# Patient Record
Sex: FEMALE | Race: WHITE | NOT HISPANIC OR LATINO | URBAN - METROPOLITAN AREA
[De-identification: names, ages, dates, MRNs, and addresses within clinical notes are randomized per-mention and may not be internally consistent; named-entity substitution may affect disease eponyms.]

---

## 2019-05-08 ENCOUNTER — APPOINTMENT (OUTPATIENT)
Dept: URBAN - METROPOLITAN AREA CLINIC 186 | Age: 24
Setting detail: DERMATOLOGY
End: 2019-05-08

## 2019-05-08 DIAGNOSIS — Z41.9 ENCOUNTER FOR PROCEDURE FOR PURPOSES OTHER THAN REMEDYING HEALTH STATE, UNSPECIFIED: ICD-10-CM

## 2019-05-08 DIAGNOSIS — D22 MELANOCYTIC NEVI: ICD-10-CM

## 2019-05-08 DIAGNOSIS — E28.2 POLYCYSTIC OVARIAN SYNDROME: ICD-10-CM

## 2019-05-08 PROBLEM — D22.61 MELANOCYTIC NEVI OF RIGHT UPPER LIMB, INCLUDING SHOULDER: Status: ACTIVE | Noted: 2019-05-08

## 2019-05-08 PROCEDURE — OTHER REASSURANCE: OTHER

## 2019-05-08 PROCEDURE — OTHER ADDITIONAL NOTES: OTHER

## 2019-05-08 PROCEDURE — OTHER COUNSELING: OTHER

## 2019-05-08 PROCEDURE — OTHER COSMETIC CONSULTATION: LHR: OTHER

## 2019-05-08 ASSESSMENT — LOCATION DETAILED DESCRIPTION DERM
LOCATION DETAILED: RIGHT DISTAL DORSAL FOREARM
LOCATION DETAILED: UPPER STERNUM
LOCATION DETAILED: PERIUMBILICAL SKIN

## 2019-05-08 ASSESSMENT — LOCATION ZONE DERM
LOCATION ZONE: ARM
LOCATION ZONE: TRUNK

## 2019-05-08 ASSESSMENT — LOCATION SIMPLE DESCRIPTION DERM
LOCATION SIMPLE: ABDOMEN
LOCATION SIMPLE: CHEST
LOCATION SIMPLE: RIGHT FOREARM

## 2019-05-08 NOTE — PROCEDURE: ADDITIONAL NOTES
Detail Level: Simple
Additional Notes: Recommended evaluation by gynecologist or endocrinologist for her PCOS since this is the driving force behind the unwanted hair growth. Discussed that treating the area but not addressing the underlying issue would be fruitless. Suggested shaving the night before and applying an over the counter topical numbing.
Additional Notes: Patient states she was unable to tolerate oral contraceptive pills due to nausea.  We discussed likelihood that alternative treatments would be recommended to minimize the potential for persistent regrowth of the terminal hirsuit hairs.  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

## 2019-05-09 ENCOUNTER — APPOINTMENT (OUTPATIENT)
Dept: URBAN - METROPOLITAN AREA CLINIC 186 | Age: 24
Setting detail: DERMATOLOGY
End: 2019-05-09

## 2019-05-09 DIAGNOSIS — Z41.9 ENCOUNTER FOR PROCEDURE FOR PURPOSES OTHER THAN REMEDYING HEALTH STATE, UNSPECIFIED: ICD-10-CM

## 2019-05-09 PROCEDURE — OTHER LASER HAIR REMOVAL: OTHER

## 2019-05-09 PROCEDURE — OTHER ADDITIONAL NOTES: OTHER

## 2019-05-09 NOTE — PROCEDURE: LASER HAIR REMOVAL
Laser Type: Alexandrite 755nm
Total Pulses: 546
Detail Level: Zone
Post-Care Instructions: I reviewed with the patient in detail post-care instructions. Patient should avoid sun for a minimum of 4 weeks before and after treatment.
Number Of Prepaid Treatments (Will Not Render If 0): 0
Fluence (Will Not Render If 0): 20
Anesthesia Type: 1% lidocaine with epinephrine
Render Post-Care In The Note: No
Location Override: breast/abdomen
Tolerated Procedure (Optional): Tolerated Well
Post-Procedure Care: Immediate endpoint: perifollicular erythema and edema. Vaseline and ice applied. Post care reviewed with patient.
Shaving (Optional): The patient shaved at home
Pre-Procedure: Prior to proceeding the treatment areas were cleaned and all present put on their eye protection.
Spot Size: 15 mm
Pulse Duration: 20 ms
Consent: Written consent obtained, risks reviewed including but not limited to crusting, scabbing, blistering, scarring, darker or lighter pigmentary change, paradoxical hair regrowth, incomplete removal of hair and infection.

## 2019-05-09 NOTE — HPI: COSMETIC (LASER HAIR REMOVAL)
Eye Color: blue
Have You Had Laser Hair Removal Before?: has not had previous treatment
When Outside In The Sun, Do You...: very rarely burns, tans very easily

## 2019-06-07 ENCOUNTER — APPOINTMENT (OUTPATIENT)
Dept: URBAN - METROPOLITAN AREA CLINIC 186 | Age: 24
Setting detail: DERMATOLOGY
End: 2019-06-07

## 2019-06-07 DIAGNOSIS — Z41.9 ENCOUNTER FOR PROCEDURE FOR PURPOSES OTHER THAN REMEDYING HEALTH STATE, UNSPECIFIED: ICD-10-CM

## 2019-06-07 PROCEDURE — OTHER ADDITIONAL NOTES: OTHER

## 2019-06-07 PROCEDURE — OTHER LASER HAIR REMOVAL: OTHER

## 2019-06-07 ASSESSMENT — LOCATION DETAILED DESCRIPTION DERM
LOCATION DETAILED: PERIUMBILICAL SKIN
LOCATION DETAILED: LEFT LATERAL SUPERIOR CHEST

## 2019-06-07 ASSESSMENT — LOCATION SIMPLE DESCRIPTION DERM
LOCATION SIMPLE: ABDOMEN
LOCATION SIMPLE: CHEST

## 2019-06-07 ASSESSMENT — LOCATION ZONE DERM: LOCATION ZONE: TRUNK

## 2019-06-07 NOTE — PROCEDURE: LASER HAIR REMOVAL
External Cooling Fan Speed: 0
Total Pulses: 550
Consent: Written consent obtained, risks reviewed including but not limited to crusting, scabbing, blistering, scarring, darker or lighter pigmentary change, paradoxical hair regrowth, incomplete removal of hair and infection.
Pulse Duration: 20 ms
Detail Level: Zone
Shaving (Optional): The patient shaved at home
Fluence (Will Not Render If 0): 20
Post-Care Instructions: I reviewed with the patient in detail post-care instructions. Patient should avoid sun for a minimum of 4 weeks before and after treatment.
Spot Size: 15 mm
Treatment Number: 2
Post-Procedure Care: Immediate endpoint: perifollicular erythema and edema. Vaseline and ice applied. Post care reviewed with patient.
Anesthesia Type: 1% lidocaine with epinephrine
Number Of Prepaid Treatments (Will Not Render If 0): 8
Tolerated Procedure (Optional): Tolerated Well
Location Override: breast/abdomen
Render Post-Care In The Note: No
Pre-Procedure: Prior to proceeding the treatment areas were cleaned and all present put on their eye protection.
Laser Type: Alexandrite 755nm

## 2019-08-02 ENCOUNTER — APPOINTMENT (OUTPATIENT)
Dept: URBAN - METROPOLITAN AREA CLINIC 186 | Age: 24
Setting detail: DERMATOLOGY
End: 2019-09-03

## 2019-08-02 DIAGNOSIS — Z41.9 ENCOUNTER FOR PROCEDURE FOR PURPOSES OTHER THAN REMEDYING HEALTH STATE, UNSPECIFIED: ICD-10-CM

## 2019-08-02 PROCEDURE — OTHER LASER HAIR REMOVAL: OTHER

## 2019-08-02 PROCEDURE — OTHER ADDITIONAL NOTES: OTHER

## 2019-08-02 ASSESSMENT — LOCATION SIMPLE DESCRIPTION DERM
LOCATION SIMPLE: ABDOMEN
LOCATION SIMPLE: CHEST

## 2019-08-02 ASSESSMENT — LOCATION DETAILED DESCRIPTION DERM
LOCATION DETAILED: LEFT LATERAL SUPERIOR CHEST
LOCATION DETAILED: PERIUMBILICAL SKIN

## 2019-08-02 ASSESSMENT — LOCATION ZONE DERM: LOCATION ZONE: TRUNK

## 2019-08-02 NOTE — PROCEDURE: LASER HAIR REMOVAL
Spot Size: 15 mm
Anesthesia Type: 1% lidocaine with epinephrine
Fluence (Will Not Render If 0): 20
Location Override: breast/abdomen
Treatment Number: 0
Tolerated Procedure (Optional): Tolerated Well
Post-Care Instructions: I reviewed with the patient in detail post-care instructions. Patient should avoid sun for a minimum of 4 weeks before and after treatment.
Pre-Procedure: Prior to proceeding the treatment areas were cleaned and all present put on their eye protection.
Render Post-Care In The Note: No
Detail Level: Zone
Total Pulses: 980
Shaving (Optional): The patient shaved at home
Number Of Prepaid Treatments (Will Not Render If 0): 8
Consent: Written consent obtained, risks reviewed including but not limited to crusting, scabbing, blistering, scarring, darker or lighter pigmentary change, paradoxical hair regrowth, incomplete removal of hair and infection.
Pulse Duration: 20 ms
Treatment Number: 2
Post-Procedure Care: Immediate endpoint: perifollicular erythema and edema. Vaseline and ice applied. Post care reviewed with patient.
Laser Type: Alexandrite 755nm

## 2020-03-10 ENCOUNTER — APPOINTMENT (OUTPATIENT)
Dept: URBAN - METROPOLITAN AREA CLINIC 186 | Age: 25
Setting detail: DERMATOLOGY
End: 2020-03-10

## 2020-03-10 DIAGNOSIS — Z41.9 ENCOUNTER FOR PROCEDURE FOR PURPOSES OTHER THAN REMEDYING HEALTH STATE, UNSPECIFIED: ICD-10-CM

## 2020-03-10 PROCEDURE — OTHER LASER HAIR REMOVAL: OTHER

## 2020-03-10 PROCEDURE — OTHER ADDITIONAL NOTES: OTHER

## 2020-03-10 ASSESSMENT — LOCATION DETAILED DESCRIPTION DERM
LOCATION DETAILED: PERIUMBILICAL SKIN
LOCATION DETAILED: LEFT LATERAL SUPERIOR CHEST

## 2020-03-10 ASSESSMENT — LOCATION SIMPLE DESCRIPTION DERM
LOCATION SIMPLE: ABDOMEN
LOCATION SIMPLE: CHEST

## 2020-03-10 ASSESSMENT — LOCATION ZONE DERM: LOCATION ZONE: TRUNK

## 2020-06-23 DIAGNOSIS — Z11.59 SPECIAL SCREENING EXAMINATION FOR VIRAL DISEASE: Primary | ICD-10-CM

## 2020-06-26 DIAGNOSIS — Z11.59 SPECIAL SCREENING EXAMINATION FOR VIRAL DISEASE: ICD-10-CM

## 2020-06-26 NOTE — LETTER
June 29, 2020        [facility]  [address] [address]          COVID-19 Antibody, IgG   Date Value Ref Range Status   06/26/2020 Negative NEG^Negative Final     Comment:     Negative results do not rule out SARS-CoV-2 infection, particularly in those   who have been in contact with the virus.  Follow-up testing with a molecular   diagnostic should be considered to rule out infection in these individuals.  Results from antibody testing should not be used as the sole basis to diagnose   or exclude SARS-CoV-2 infection or to inform infection status.           You have tested NEGATIVE for COVID-19 antibodies. This suggests you have not had or been exposed to COVID-19. But it does not mean that for sure.     The test finds antibodies in most people 10 days after they get sick. For some people, it takes longer than 10 days for antibodies to show up. Others may never show antibodies against COVID-19, especially if they have weak immune systems.    If you have COVID-19 symptoms now, please stay home and away from others.     What is antibody testing?    This is a kind of blood test. We take a small sample of your blood, and then test it for something called  antibodies.      Your body makes antibodies to fight infection. If your blood has antibodies for a certain germ, it means you ve been infected with that germ in the past.     Sometimes, antibodies stay in your body for years after you ve had the infection. They can be there even if the germ didn t make you sick. They are a sign that your body fought off the infection.    Will this test find antibodies in everyone who s had COVID-19?    No. The test finds antibodies in most people 10 days after they get sick. For some people, it takes longer than 10 days for antibodies to show up. Others may never show antibodies against COVID-19, especially if they have weak immune systems.    What does it mean if the test finds COVID-19 antibodies?    If we find  these antibodies, it suggests:     This person has had the virus.     Their body s immune system fought the virus.     We don t know if this will help protect someone from getting COVID-19 again. Scientists are still learning about this.    What are the signs of COVID-19?    Signs of COVID-19 can appear from 2 to 14 days (up to 2 weeks) after you re infected. Some people have no symptoms or only mild symptoms. Others get very sick. The most common symptoms are:          Cough    Shortness of breath or trouble breathing  Or at least 2 of these symptoms:    Fever    Chills    Repeated shaking with chills    Muscle pain    Headache    Sore throat    Losing your sense of taste or smell    You may have other symptoms. Please contact your doctor or clinic for any symptoms that worry you.    Where can I get more information?     To learn the [facility] guidelines for staying home, please visit the [place] Department of Health website at [email or web address].[facility].us/diseases/coronavirus/basics.html    To learn more about COVID-19 and how to care for yourself at home, please visit the CDC website at https://www.cdc.gov/coronavirus/2019-ncov/about/steps-when-sick.html    For more options for care at [facility], please visit our website at [email or web address]    [facility] ([facility]) COVID-19 Hotline:  [phone]

## 2020-06-26 NOTE — LETTER
June 27, 2020        [facility]  [address] [address]    This letter provides a written record that you were tested for COVID-19 on  6/26/20..       Your result was negative. This means that we didn t find the virus that causes COVID-19 in your sample. A test may show negative when you do actually have the virus. This can happen when the virus is in the early stages of infection, before you feel illness symptoms.    If you have symptoms   Stay home and away from others (self-isolate) until you meet ALL of the guidelines below:    You ve had no fever--and no medicine that reduces fever--for 3 full days (72 hours). And      Your other symptoms have gotten better. For example, your cough or breathing has improved. And     At least 10 days have passed since your symptoms started.    During this time:    Stay home. Don t go to work, school or anywhere else.     Stay in your own room, including for meals. Use your own bathroom if you can.    Stay away from others in your home. No hugging, kissing or shaking hands. No visitors.    Clean  high touch  surfaces often (doorknobs, counters, handles, etc.). Use a household cleaning spray or wipes. You can find a full list on the EPA website at www.epa.gov/pesticide-registration/list-n-disinfectants-use-against-sars-cov-2.    Cover your mouth and nose with a mask, tissue or washcloth to avoid spreading germs.    Wash your hands and face often with soap and water.    Going back to work  Check with your employer for any guidelines to follow for going back to work.    Employers: This document serves as formal notice that your employee tested negative for COVID-19, as of the testing date shown above.

## 2020-06-27 LAB
SARS-COV-2 RNA SPEC QL NAA+PROBE: NOT DETECTED
SPECIMEN SOURCE: NORMAL

## 2020-06-28 DIAGNOSIS — Z11.59 SPECIAL SCREENING EXAMINATION FOR VIRAL DISEASE: ICD-10-CM

## 2020-06-28 NOTE — LETTER
June 30, 2020        [facility]  [address] [address]    This letter provides a written record that you were tested for COVID-19 on 6/28/20.       Your result was negative. This means that we didn t find the virus that causes COVID-19 in your sample. A test may show negative when you do actually have the virus. This can happen when the virus is in the early stages of infection, before you feel illness symptoms.    If you have symptoms   Stay home and away from others (self-isolate) until you meet ALL of the guidelines below:    You ve had no fever--and no medicine that reduces fever--for 3 full days (72 hours). And      Your other symptoms have gotten better. For example, your cough or breathing has improved. And     At least 10 days have passed since your symptoms started.    During this time:    Stay home. Don t go to work, school or anywhere else.     Stay in your own room, including for meals. Use your own bathroom if you can.    Stay away from others in your home. No hugging, kissing or shaking hands. No visitors.    Clean  high touch  surfaces often (doorknobs, counters, handles, etc.). Use a household cleaning spray or wipes. You can find a full list on the EPA website at www.epa.gov/pesticide-registration/list-n-disinfectants-use-against-sars-cov-2.    Cover your mouth and nose with a mask, tissue or washcloth to avoid spreading germs.    Wash your hands and face often with soap and water.    Going back to work  Check with your employer for any guidelines to follow for going back to work.    Employers: This document serves as formal notice that your employee tested negative for COVID-19, as of the testing date shown above.

## 2020-06-29 LAB
COVID-19 ANTIBODY IGG: NEGATIVE
LAB TEST METHOD: NORMAL
SARS-COV-2 RNA SPEC QL NAA+PROBE: NOT DETECTED
SPECIMEN SOURCE: NORMAL

## 2020-06-30 ENCOUNTER — OFFICE VISIT (OUTPATIENT)
Dept: FAMILY MEDICINE | Facility: CLINIC | Age: 25
End: 2020-06-30
Payer: COMMERCIAL

## 2020-06-30 VITALS
HEART RATE: 64 BPM | TEMPERATURE: 97.6 F | HEIGHT: 70 IN | BODY MASS INDEX: 29.41 KG/M2 | DIASTOLIC BLOOD PRESSURE: 79 MMHG | WEIGHT: 205.4 LBS | SYSTOLIC BLOOD PRESSURE: 122 MMHG

## 2020-06-30 DIAGNOSIS — R53.83 OTHER FATIGUE: Primary | ICD-10-CM

## 2020-06-30 SDOH — HEALTH STABILITY: MENTAL HEALTH: HOW OFTEN DO YOU HAVE A DRINK CONTAINING ALCOHOL?: 2-4 TIMES A MONTH

## 2020-06-30 SDOH — HEALTH STABILITY: MENTAL HEALTH: HOW OFTEN DO YOU HAVE SIX OR MORE DRINKS ON ONE OCCASION?: LESS THAN MONTHLY

## 2020-06-30 SDOH — HEALTH STABILITY: MENTAL HEALTH: HOW MANY DRINKS CONTAINING ALCOHOL DO YOU HAVE ON A TYPICAL DAY WHEN YOU ARE DRINKING?: 3 OR 4

## 2020-06-30 ASSESSMENT — ANXIETY QUESTIONNAIRES
3. WORRYING TOO MUCH ABOUT DIFFERENT THINGS: SEVERAL DAYS
5. BEING SO RESTLESS THAT IT IS HARD TO SIT STILL: SEVERAL DAYS
GAD7 TOTAL SCORE: 7
6. BECOMING EASILY ANNOYED OR IRRITABLE: SEVERAL DAYS
1. FEELING NERVOUS, ANXIOUS, OR ON EDGE: SEVERAL DAYS
7. FEELING AFRAID AS IF SOMETHING AWFUL MIGHT HAPPEN: SEVERAL DAYS
IF YOU CHECKED OFF ANY PROBLEMS ON THIS QUESTIONNAIRE, HOW DIFFICULT HAVE THESE PROBLEMS MADE IT FOR YOU TO DO YOUR WORK, TAKE CARE OF THINGS AT HOME, OR GET ALONG WITH OTHER PEOPLE: SOMEWHAT DIFFICULT
2. NOT BEING ABLE TO STOP OR CONTROL WORRYING: SEVERAL DAYS

## 2020-06-30 ASSESSMENT — PATIENT HEALTH QUESTIONNAIRE - PHQ9
5. POOR APPETITE OR OVEREATING: SEVERAL DAYS
SUM OF ALL RESPONSES TO PHQ QUESTIONS 1-9: 3

## 2020-06-30 ASSESSMENT — MIFFLIN-ST. JEOR: SCORE: 1771.94

## 2020-06-30 NOTE — PROGRESS NOTES
"Rodrigo Barboza  Vitals: /79   Pulse 64   Temp 97.6  F (36.4  C)   Ht 1.778 m (5' 10\")   Wt 93.2 kg (205 lb 6.4 oz)   BMI 29.47 kg/m    BMI= Body mass index is 29.47 kg/m .  Sport(s): Basketball    Vision: Right Eye: 20/100 Left Eye: 20/40 Both Eyes: 20/40  Correction: glasses and contacts but she isn't wearing them now.    Pupils: equal    Sickle Cell Trait: Discussed and Patient refused Sickle Cell Trait testing and signed waiver  Concussions: Concussion fact sheet reviewed. 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    General/Medical  Eyes/Vision: Normal; NPC:  1/2 cm  Ears/Hearing: Normal  Nose: Normal  Mouth/Dental: Normal  Throat: Normal  Thyroid: Normal  Lymph Nodes: Normal  Lungs: Normal  Abdomen: Normal  Skin: Normal    Musculoskeletal/Orthopaedic  Neck/Cervical: Normal  Thoracic/Lumbar: Normal  Shoulder/Upper Arm: Normal  Elbow/Forearm: Normal  Wrist/Hand/Fingers: Normal  Hip/Thigh: Normal  Knee/Patella: Normal  Lower Leg/Ankles: Normal  Foot/Toes: Normal    Cardiovascular Screening    Heart Murmur:No Grade: NA  Symmetric Femoral pulses: Yes    Stigmata of Marfan's Syndrome - if appropriate:  Not applicable      TRIAD Risk Factors  Low EA withor without DE/ED Some dietary restrictions, current/part history of DE   Low BMI BMI > 18.5 or > 90% EW** or weight stable   Delayed Menarche Menarche before 15 years   Oligomenorrhea and/or Amenorrhea 6 - 9 menses in 12 months   Low BMD     Stress Reaction/Fracture  Specific Bone(s)  Other Bone None         TRIAD Score   Risk Score Status     Cumulative Risk 2 - Moderate Risk   Assessment Full Clearance   Medical Plan         COMMENTS, RECOMMENDATIONS and PARTICIPATION STATUS  Cleared    1) PCOS:  Not on OCPs, 3 types tried but lots of side effects.  Using condoms for contraception.   2) Weight gain starting with 30 lbs Spring 2019 and now has gained 15-20 more lbs.  Lost 15 lbs over break by doing 2 cardio " workouts per day and only eating 2 meals per day.    -Saw Endocrinologist in [place] and recommended labs and thyroid US and dexamethasone suppression test  -Will need a referral to Endocrinologist at     3) Premenstrual Migraines occasionally:  None recently, no meds    4)  H/o of depression in the past and more recently MANPREET with panic attacks.  No meds.  Doing well.   -Will likely need Psychological intervention for mental health and     Walt Reynolds MD, Sports Medicine Fellow was present and examined the patient as well.      Reina Fields MD, CAQ, FACSM, CCD  [facility]  Sports Medicine and Bone Health  Team Physician;  Athletics        -

## 2020-06-30 NOTE — LETTER
"  6/30/2020      RE: [facility]  [address] [address]       [facility]  Vitals: /79   Pulse 64   Temp 97.6  F (36.4  C)   Ht 1.778 m (5' 10\")   Wt 93.2 kg (205 lb 6.4 oz)   BMI 29.47 kg/m    BMI= Body mass index is 29.47 kg/m .  Sport(s): Basketball    Vision: Right Eye: 20/100 Left Eye: 20/40 Both Eyes: 20/40  Correction: glasses and contacts but she isn't wearing them now.    Pupils: equal    Sickle Cell Trait: Discussed and Patient refused Sickle Cell Trait testing and signed waiver  Concussions: Concussion fact sheet reviewed. 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    General/Medical  Eyes/Vision: Normal; NPC:  1/2 cm  Ears/Hearing: Normal  Nose: Normal  Mouth/Dental: Normal  Throat: Normal  Thyroid: Normal  Lymph Nodes: Normal  Lungs: Normal  Abdomen: Normal  Skin: Normal    Musculoskeletal/Orthopaedic  Neck/Cervical: Normal  Thoracic/Lumbar: Normal  Shoulder/Upper Arm: Normal  Elbow/Forearm: Normal  Wrist/Hand/Fingers: Normal  Hip/Thigh: Normal  Knee/Patella: Normal  Lower Leg/Ankles: Normal  Foot/Toes: Normal    Cardiovascular Screening    Heart Murmur:No Grade: NA  Symmetric Femoral pulses: Yes    Stigmata of Marfan's Syndrome - if appropriate:  Not applicable      TRIAD Risk Factors  Low EA withor without DE/ED Some dietary restrictions, current/part history of DE   Low BMI BMI > 18.5 or > 90% EW** or weight stable   Delayed Menarche Menarche before 15 years   Oligomenorrhea and/or Amenorrhea 6 - 9 menses in 12 months   Low BMD     Stress Reaction/Fracture  Specific Bone(s)  Other Bone None         TRIAD Score   Risk Score Status     Cumulative Risk 2 - Moderate Risk   Assessment Full Clearance   Medical Plan         COMMENTS, RECOMMENDATIONS and PARTICIPATION STATUS  Cleared    1) PCOS:  Not on OCPs, 3 types tried but lots of side effects.  Using condoms for contraception.   2) Weight gain starting with 30 lbs Spring 2019 " and now has gained 15-20 more lbs.  Lost 15 lbs over break by doing 2 cardio workouts per day and only eating 2 meals per day.    -Saw Endocrinologist in [place] and recommended labs and thyroid US and dexamethasone suppression test  -Will need a referral to Endocrinologist at     3) Premenstrual Migraines occasionally:  None recently, no meds    4)  H/o of depression in the past and more recently MANPREET with panic attacks.  No meds.  Doing well.   -Will likely need Psychological intervention for mental health and     Walt Reynolds MD, Sports Medicine Fellow was present and examined the patient as well.      Reina Fields MD, CAQ, FACSM, CCD  [facility]  Sports Medicine and Bone Health  Team Physician;  Athletics        -      Reina Fields MD

## 2020-06-30 NOTE — LETTER
Date:July 1, 2020      Patient was self referred, no letter generated. Do not send.        [facility] Health Information

## 2020-07-01 ASSESSMENT — ANXIETY QUESTIONNAIRES: GAD7 TOTAL SCORE: 7

## 2020-07-02 ENCOUNTER — ANCILLARY PROCEDURE (OUTPATIENT)
Dept: ULTRASOUND IMAGING | Facility: CLINIC | Age: 25
End: 2020-07-02
Attending: FAMILY MEDICINE
Payer: COMMERCIAL

## 2020-07-02 DIAGNOSIS — R53.83 OTHER FATIGUE: ICD-10-CM

## 2020-07-02 LAB
CORTIS SERPL-MCNC: 0.6 UG/DL (ref 4–22)
T4 FREE SERPL-MCNC: 0.88 NG/DL (ref 0.76–1.46)
TSH SERPL DL<=0.005 MIU/L-ACNC: 1.11 MU/L (ref 0.4–4)

## 2020-07-03 LAB
IGF-I BLD-MCNC: 206 NG/ML (ref 105–337)
THYROPEROXIDASE AB SERPL-ACNC: <10 IU/ML

## 2020-07-06 LAB — 17OHP SERPL-MCNC: 139 NG/DL

## 2020-07-21 ENCOUNTER — VIRTUAL VISIT (OUTPATIENT)
Dept: FAMILY MEDICINE | Facility: CLINIC | Age: 25
End: 2020-07-21
Payer: COMMERCIAL

## 2020-07-21 DIAGNOSIS — F32.A DEPRESSION, UNSPECIFIED DEPRESSION TYPE: ICD-10-CM

## 2020-07-21 DIAGNOSIS — F41.9 ANXIETY: ICD-10-CM

## 2020-07-21 DIAGNOSIS — R63.5 WEIGHT GAIN: Primary | ICD-10-CM

## 2020-07-21 NOTE — PROGRESS NOTES
[facility]  Sports Medicine Clinic  Virtual Visit           SUBJECTIVE:     Rodrigo Barboza is a 24 year old female presenting to clinic today for follow up.    Review of records:   - PCOS: Patient's not on any oral contraceptives.  Has tried 3 different kinds in the past however was unable to tolerate side effects.  Currently using condoms for contraception.  - Undergoing work-up for weight gain.  She had a 30 pound weight gain back in spring 2019 and now has gained 15-20 more pounds.  Was able to lose approximately 15 pounds recently by doing extensive cardio workouts.  2 a day and only eating 2 meals.  -Was seen by endocrinology in [place] who recommended labs and thyroid ultrasound.  They also recommended dexamethasone suppression test.  -Referral was placed to endocrinologist at the [facility].  -History of depression in the past and generalized anxiety disorder with panic attacks.  Not currently on any medications and overall doing well.    Interim history:   Rodrigo overall feels like she is doing well.  Patient did go to get her laboratory work done.  Dexamethasone suppression test showed a cortisol level of 0 point 6 in the morning.  Reference range of 4- 22.  Progesterone level within normal range.  Insulin like growth factor within normal range.  As recommended per endocrinology she had a thyroid ultrasound done which showed a very small nodule on the right thyroid without any concerning features.  Thyroid gland itself is not enlarged.  Thyroid antibodies negative. Normal TSH and free T4.  Overall patient is doing well.  Has not followed up with sports psychology yet at this time.    PMH, Medications and Allergies were reviewed and updated as needed.    ROS:  As noted above otherwise negative.    There is no problem list on file for this patient.      No current outpatient medications on file.              OBJECTIVE:     Vitals: NA todays visit was a virtual  Visit.      Exam:  Constitutional: healthy, alert and no distress  Psychiatric: mentation appears normal and affect normal/bright          ASSESSMENT & PLAN:      1. Weight gain  Discussed with patient the importance of following up with endocrinology regarding recent laboratory work and thyroid studies.  Patient expressed understanding and plans to make a follow-up appointment with them.  We will have a release of records sent so that we can see any further laboratory work that is done.    2. Depression, unspecified depression type  3. Anxiety  Overall patient is feeling well at this time.  Does need to touch base with sports psychology.  Katie Orellana, ATC agreed to help Rodrigo set this up.      Follow-up in 3-4 weeks.  Return sooner if develops new or worsening symptoms.    Appointment start time:  10:58 AM  Appointment end time: 11:16 AM    This is a telephone visit that took 18 minutes.    Options for treatment and/or follow-up care were reviewed with the patient and , Max Trammell. Patient was actively involved in the decision making process. Patient verbalized understanding and was in agreement with the plan.    The patient was seen by and discussed with Dr.Suzanne MIKE Fields MD, CAQ, CCD    Sofi Torres DO  Primary Care Sports Medicine Fellow

## 2020-07-21 NOTE — LETTER
7/21/2020      RE: Rodrigo Barboza  [address] [facility] [address] [address]       [facility]  Sports Medicine Clinic  Virtual Visit           SUBJECTIVE:     Rodrigo Barboza is a 24 year old female presenting to clinic today for follow up.    Review of records:   - PCOS: Patient's not on any oral contraceptives.  Has tried 3 different kinds in the past however was unable to tolerate side effects.  Currently using condoms for contraception.  - Undergoing work-up for weight gain.  She had a 30 pound weight gain back in spring 2019 and now has gained 15-20 more pounds.  Was able to lose approximately 15 pounds recently by doing extensive cardio workouts.  2 a day and only eating 2 meals.  -Was seen by endocrinology in [place] who recommended labs and thyroid ultrasound.  They also recommended dexamethasone suppression test.  -Referral was placed to endocrinologist at the [facility].  -History of depression in the past and generalized anxiety disorder with panic attacks.  Not currently on any medications and overall doing well.    Interim history:   Rodrigo overall feels like she is doing well.  Patient did go to get her laboratory work done.  Dexamethasone suppression test showed a cortisol level of 0 point 6 in the morning.  Reference range of 4- 22.  Progesterone level within normal range.  Insulin like growth factor within normal range.  As recommended per endocrinology she had a thyroid ultrasound done which showed a very small nodule on the right thyroid without any concerning features.  Thyroid gland itself is not enlarged.  Thyroid antibodies negative. Normal TSH and free T4.  Overall patient is doing well.  Has not followed up with sports psychology yet at this time.    PMH, Medications and Allergies were reviewed and updated as needed.    ROS:  As noted above otherwise negative.    There is no problem list on file for this patient.      No current outpatient medications on file.               OBJECTIVE:     Vitals: NA todays visit was a virtual Visit.      Exam:  Constitutional: healthy, alert and no distress  Psychiatric: mentation appears normal and affect normal/bright          ASSESSMENT & PLAN:      1. Weight gain  Discussed with patient the importance of following up with endocrinology regarding recent laboratory work and thyroid studies.  Patient expressed understanding and plans to make a follow-up appointment with them.  We will have a release of records sent so that we can see any further laboratory work that is done.    2. Depression, unspecified depression type  3. Anxiety  Overall patient is feeling well at this time.  Does need to touch base with sports psychology.  Katie Orellana, ATC agreed to help Rodrigo set this up.      Follow-up in 3-4 weeks.  Return sooner if develops new or worsening symptoms.    Appointment start time:  10:58 AM  Appointment end time: 11:16 AM    This is a telephone visit that took 18 minutes.    Options for treatment and/or follow-up care were reviewed with the patient and , Max Tramemll. Patient was actively involved in the decision making process. Patient verbalized understanding and was in agreement with the plan.    The patient was seen by and discussed with Dr.Suzanne MIKE Fields MD, CAQ, CCD    Sofi Torres DO  Primary Care Sports Medicine Fellow      Attending Note:   I have talked with this patient along with Dr. Torres via video visit and have reviewed the clinical presentation, imaging and labs with the fellow. I agree with the treatment plan as outlined. The plan was formulated with the fellow on the day of the patient's visit.   Reina Fields MD, CAQ, CCD  [facility]  Sports Medicine and Bone Health    Results for RODRIGO MOREL (MRN [id number]) as of 7/22/2020 21:29   Ref. Range 7/2/2020 07:27   17-OH Progesterone Latest Units: ng/dL 139   Cortisol Serum Latest Ref Range: 4 - 22 ug/dL 0.6 (L)   T4 Free Latest  Ref Range: 0.76 - 1.46 ng/dL 0.88   TSH Latest Ref Range: 0.40 - 4.00 mU/L 1.11   Ins Growth Factor 1 Latest Ref Range: 105 - 337 ng/ml 206   Thyroid Peroxidase Antibody Latest Ref Range: <35 IU/mL <10           Reina Fields MD

## 2020-07-21 NOTE — LETTER
Date:July 23, 2020      Patient was self referred, no letter generated. Do not send.        [facility] Health Information

## 2020-07-23 NOTE — PROGRESS NOTES
Attending Note:   I have talked with this patient along with Dr. Torres via video visit and have reviewed the clinical presentation, imaging and labs with the fellow. I agree with the treatment plan as outlined. The plan was formulated with the fellow on the day of the patient's visit.   Reina Fields MD, CAQ, CCD  [facility]  Sports Medicine and Bone Health    Results for HOUSTON MOREL (MRN [id number]) as of 7/22/2020 21:29   Ref. Range 7/2/2020 07:27   17-OH Progesterone Latest Units: ng/dL 139   Cortisol Serum Latest Ref Range: 4 - 22 ug/dL 0.6 (L)   T4 Free Latest Ref Range: 0.76 - 1.46 ng/dL 0.88   TSH Latest Ref Range: 0.40 - 4.00 mU/L 1.11   Ins Growth Factor 1 Latest Ref Range: 105 - 337 ng/ml 206   Thyroid Peroxidase Antibody Latest Ref Range: <35 IU/mL <10

## 2020-07-27 ENCOUNTER — TRANSFERRED RECORDS (OUTPATIENT)
Dept: HEALTH INFORMATION MANAGEMENT | Facility: CLINIC | Age: 25
End: 2020-07-27

## 2020-07-27 ENCOUNTER — DOCUMENTATION ONLY (OUTPATIENT)
Dept: FAMILY MEDICINE | Facility: CLINIC | Age: 25
End: 2020-07-27
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
## 2020-08-05 NOTE — PROGRESS NOTES
[facility] ATHLETIC MEDICINE  [facility]   Sport Psychology Intake Note      Location of Visit: [facility] Athletic Department  Date of Visit: 7/27/20  Duration of Session: 60 minutes  Referred by:     Rodrigo Barboza is a 24 year old Choose not to answer female.  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 She is not an international student.     Self-reported Concerns/Symptoms:  Anxiety/worry  Body image  Transition/adjustment    Presenting Concern:  I reports that she has experienced recent weight gain of about 30lbs in past 6 months and feels anxious about it.     Suicide and Risk Assessment:  Recent suicidal thoughts: No  Past suicidal thoughts: No  Recent homicidal thoughts: No  Any attempts in the past: No  Any family/friends/loved ones die by suicide: No  Plan or considering various methods: No  Access to guns: No  Protective factors: no h/o suicide attempt, no plan or intent, no h/o risky impulsive behavior, no access to lethal means, h/o seeking help when needed, future oriented, feeling hopeful, none to minimal alcohol use , commitment to family, good social support   and stable housing  Verbal contract for safety: Yes    Rodrigo denies current urges to self-harm, homicidal ideation, suicidal ideation, means, plans, or intent.    Mental Status & Observations:  Rodrigo appeared generally alert and oriented. Dress was appropriate to the weather and occasion. Grooming and hygiene were appropriate. Eye contact was good. Speech was of normal volume and normal. Thought processes were relevant, logical and goal-directed. Thought content was within normal limits with no evidence of psychotic or paranoid features. Memory appeared intact. She exhibited normal motor activity during the appointment. Mood was appropriate with congruent affect. Insight and judgment appeared age appropriate with good focus in session.  Behavior was  "candid, congenial, cooperative and open    Family Background:  ***    Education:  Ct is in her first year of her graduate program at [employer] in sports management. She notes that she excels academically, especially when she is interested in the subject matter. She reports that she earned a 4.0 GPA in HS. 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 She attended the [employer] her freshman and sophomore years of college. She red-shirted her first year, and states that she did not experience what she anticipated at [employer] with respect to playing time, feeling valued on the team and her relationship with her . Ct participated in sport psychology services at [employer] with Dr. Esteban Wilson for concerns regarding anxiety, depression and concentration difficulties. She recalls at times it being difficult to get out of bed during her sophomore year. Ct states that she decided to transfer to [employer] for her phuong and senior years. She describes another disappointing experience where she was \"punished\" for not playing well, had troubles communicating with her  and felt \"disliked by my .\" She recalls experiencing panic attacks prior to practices    History of medical and mental health concerns:  Concussion: {NO/YES:128235}  Current/past sports injury: {NO/YES:466794}  Nutrition/eating/appetite: {NO/YES:314002}  Body image: {NO/YES:157824}  Substance use (alcohol, caffeine, tobacco, cannabis, other): {NO/YES:007114}  Family history of substance abuse: {NO/YES:891442}  Medications/vitamins/supplements: ***  Concentration/focus/ADHD: {NO/YES:562159}  Caffeine use: {NO/YES:150719}  PTSD/trauma/abuse: {NO/YES:312671}  Significant loss: {NO/YES:452410}  Known history of mental health in self: {bisphx2:857965}  History of therapy or " prescribed medications: {NO/YES:216785}  Known history of mental health in family member(s): {NO/YES:459935}  Legal issues: {NO/YES:182950}  Financial concerns: {NO/YES:122930}   Receives {bispscholar:600855} scholarship  Stephanie/Hobbies/Personality:    Identifies as {bispreligion:996016}   Reported hobbies consist of ***    Coping skills, strengths and supports:   {bispcope:154053}    Goals for counseling:  ***    Clinical impressions or Other:  ***     Therapy objectives/goals:  {bisptxgoals:433914}    Therapy follow-up plan:  {bispfup:057444}      Devonte Guerrier, PhD LP, [facility]

## 2020-08-11 ENCOUNTER — OFFICE VISIT (OUTPATIENT)
Dept: ORTHOPEDICS | Facility: CLINIC | Age: 25
End: 2020-08-11
Payer: COMMERCIAL

## 2020-08-11 DIAGNOSIS — M25.462 PAIN AND SWELLING OF LEFT KNEE: Primary | ICD-10-CM

## 2020-08-11 DIAGNOSIS — M25.562 PAIN AND SWELLING OF LEFT KNEE: Primary | ICD-10-CM

## 2020-08-11 DIAGNOSIS — M25.562 ACUTE PAIN OF LEFT KNEE: Primary | ICD-10-CM

## 2020-08-11 NOTE — LETTER
Date:August 14, 2020      Patient was self referred, no letter generated. Do not send.        [facility]icians Health Information

## 2020-08-11 NOTE — LETTER
8/11/2020      RE: Rodrigo Barboza  [address] [address]       Initial Visit     Referring MD: No ref. provider found     CC: Left knee swelling and pain     HPI: Rodrigo Barboza is a 24 year old female varsity  who presents with left knee swelling and pain. She has a history of a partial medial meniscectomy in the knee when she was in 8th grade. Her understanding is that it was a bucket handle tear, but that the meniscus had to be removed. She has had occasional symptoms with this knee over time, but more recently she has had more swelling and associated pain. The symptoms are activity related, and they are starting to impact her ability to participate in all volleyball activities.     PMH: There is no problem list on file for this patient.       PSH:   Past Surgical History:   Procedure Laterality Date     KNEE SURGERY          Medications:   No current outpatient medications on file.     No current facility-administered medications for this visit.         Allergies:   Allergies   Allergen Reactions     Sulfa Drugs Hives        SH:   Social History     Occupational History     Not on file   Tobacco Use     Smoking status: Never Smoker   Substance and Sexual Activity     Alcohol use: Yes     Frequency: 2-4 times a month     Drinks per session: 3 or 4     Binge frequency: Less than monthly     Drug use: Never     Sexual activity: Not Currently     Partners: Male        ROS: General ROS: negative  Respiratory ROS: no cough, shortness of breath, or wheezing  Cardiovascular ROS: no chest pain or dyspnea on exertion     PE:   Gen: A&OX3    Knee       RIGHT   LEFT   Skin:    Intact   Intact   ROM:     -2-135  -2-130   Effusion:    Neg   Mild-mod  Medial joint line tenderness: Neg   Neg   Lateral joint line tenderness: Neg   Neg   Anisha:    Neg   Neg   Patella crepitus:   Neg   Neg   Patella tenderness:   Neg   Neg   Lachman:    Neg   Neg   Pivot shift:    Neg   Neg   Valgus stress:    Neg   Neg   Varus stress:    Neg   Neg   Posterior drawer:   Neg   Neg   N-V     intact   intact   Hip:     nml   nml   Lower extremity edema:  Neg   Neg     The knee feels tight in hyperflexion.      Impression:   24 year old female with left knee swelling and occasional associated pain     Plan:   The risks and benefits of the available surgical and non-surgical treatment options were discussed with the patient/athlete.  The concern is that this is related to downstream effects from her partial meniscectomy.  We will obtain baseline radiographs, including long leg standing films, as well as an MRI to evaluate the integrity of the cartilage and meniscus.      cc:   Referring provider   [unfilled]     Primary care provider   No primary provider on file.       Tae Haynes MD

## 2020-08-12 ENCOUNTER — ANCILLARY PROCEDURE (OUTPATIENT)
Dept: GENERAL RADIOLOGY | Facility: CLINIC | Age: 25
End: 2020-08-12
Attending: ORTHOPAEDIC SURGERY
Payer: COMMERCIAL

## 2020-08-12 ENCOUNTER — ANCILLARY PROCEDURE (OUTPATIENT)
Dept: MRI IMAGING | Facility: CLINIC | Age: 25
End: 2020-08-12
Attending: ORTHOPAEDIC SURGERY
Payer: COMMERCIAL

## 2020-08-12 DIAGNOSIS — M25.562 ACUTE PAIN OF LEFT KNEE: ICD-10-CM

## 2020-08-12 DIAGNOSIS — Z11.59 SPECIAL SCREENING EXAMINATION FOR VIRAL DISEASE: Primary | ICD-10-CM

## 2020-08-12 NOTE — PROGRESS NOTES
Initial Visit     Referring MD: No ref. provider found     CC: Left knee swelling and pain     HPI: Rodrigo Barboza is a 24 year old female varsity  who presents with left knee swelling and pain. She has a history of a partial medial meniscectomy in the knee when she was in 8th grade. Her understanding is that it was a bucket handle tear, but that the meniscus had to be removed. She has had occasional symptoms with this knee over time, but more recently she has had more swelling and associated pain. The symptoms are activity related, and they are starting to impact her ability to participate in all volleyball activities.     PMH: There is no problem list on file for this patient.       PSH:   Past Surgical History:   Procedure Laterality Date     KNEE SURGERY          Medications:   No current outpatient medications on file.     No current facility-administered medications for this visit.         Allergies:   Allergies   Allergen Reactions     Sulfa Drugs Hives        SH:   Social History     Occupational History     Not on file   Tobacco Use     Smoking status: Never Smoker   Substance and Sexual Activity     Alcohol use: Yes     Frequency: 2-4 times a month     Drinks per session: 3 or 4     Binge frequency: Less than monthly     Drug use: Never     Sexual activity: Not Currently     Partners: Male        ROS: General ROS: negative  Respiratory ROS: no cough, shortness of breath, or wheezing  Cardiovascular ROS: no chest pain or dyspnea on exertion     PE:   Gen: A&OX3    Knee       RIGHT   LEFT   Skin:    Intact   Intact   ROM:     -2-135  -2-130   Effusion:    Neg   Mild-mod  Medial joint line tenderness: Neg   Neg   Lateral joint line tenderness: Neg   Neg   Anisha:    Neg   Neg   Patella crepitus:   Neg   Neg   Patella tenderness:   Neg   Neg   Lachman:    Neg   Neg   Pivot shift:    Neg   Neg   Valgus stress:   Neg   Neg   Varus stress:    Neg   Neg   Posterior drawer:   Neg   Neg   N-V      intact   intact   Hip:     nml   nml   Lower extremity edema:  Neg   Neg     The knee feels tight in hyperflexion.      Impression:   24 year old female with left knee swelling and occasional associated pain     Plan:   The risks and benefits of the available surgical and non-surgical treatment options were discussed with the patient/athlete.  The concern is that this is related to downstream effects from her partial meniscectomy.  We will obtain baseline radiographs, including long leg standing films, as well as an MRI to evaluate the integrity of the cartilage and meniscus.      cc:   Referring provider   [unfilled]     Primary care provider   No primary provider on file.

## 2020-08-13 DIAGNOSIS — Z11.59 SPECIAL SCREENING EXAMINATION FOR VIRAL DISEASE: ICD-10-CM

## 2020-08-14 ENCOUNTER — DOCUMENTATION ONLY (OUTPATIENT)
Dept: FAMILY MEDICINE | Facility: CLINIC | Age: 25
End: 2020-08-14

## 2020-08-14 NOTE — PROGRESS NOTES
[facility] ATHLETICS  Concetta ATC initial assessment note  Date of service performed: 08/14/2020    Concern: Chronic injury  Body part: Knee  Description: Left  Injury: Chrondral defect, medical meniscus  Type: Athletics related  Date of injury: chronic    Pt. Had a phone conference with myself and Dr. Tae Haynes this afternoon to review the imaging that was taken on 08/12/2020.  Dr. Haynes discussed the MRI findings and explain conservative vs surgical options.  At this time, pt is preferring to proceed with non-surgical interventions including wearing an  brace for VB activity, reducing training volume, restricting jumping activity in VB and in the weight room.  She is going to speak with her mother tonight and will return with any questions.    Plan: continue to monitor swelling and pain.  Use modalities and training volume modification to reduce pain and monitor for new/changing symptoms.    Max Jaffe, ATC

## 2020-08-15 LAB
SARS-COV-2 RNA SPEC QL NAA+PROBE: NOT DETECTED
SPECIMEN SOURCE: NORMAL

## 2020-08-18 DIAGNOSIS — Z11.59 SPECIAL SCREENING EXAMINATION FOR VIRAL DISEASE: Primary | ICD-10-CM

## 2020-08-20 DIAGNOSIS — Z11.59 SPECIAL SCREENING EXAMINATION FOR VIRAL DISEASE: ICD-10-CM

## 2020-08-21 LAB
SARS-COV-2 RNA SPEC QL NAA+PROBE: NOT DETECTED
SPECIMEN SOURCE: NORMAL

## 2020-08-27 DIAGNOSIS — Z11.59 SPECIAL SCREENING EXAMINATION FOR VIRAL DISEASE: ICD-10-CM

## 2020-08-28 LAB
SARS-COV-2 RNA SPEC QL NAA+PROBE: NOT DETECTED
SPECIMEN SOURCE: NORMAL

## 2020-09-09 DIAGNOSIS — Z11.59 SPECIAL SCREENING EXAMINATION FOR VIRAL DISEASE: Primary | ICD-10-CM

## 2020-09-12 DIAGNOSIS — Z11.59 SPECIAL SCREENING EXAMINATION FOR VIRAL DISEASE: ICD-10-CM

## 2020-09-12 LAB
SARS-COV-2 RNA SPEC QL NAA+PROBE: NOT DETECTED
SPECIMEN SOURCE: NORMAL

## 2020-09-18 DIAGNOSIS — Z11.59 SPECIAL SCREENING EXAMINATION FOR VIRAL DISEASE: ICD-10-CM

## 2020-09-19 LAB
SARS-COV-2 RNA SPEC QL NAA+PROBE: NOT DETECTED
SPECIMEN SOURCE: NORMAL

## 2020-09-28 DIAGNOSIS — Z11.59 SPECIAL SCREENING EXAMINATION FOR VIRAL DISEASE: Primary | ICD-10-CM

## 2020-09-29 DIAGNOSIS — Z11.59 SPECIAL SCREENING EXAMINATION FOR VIRAL DISEASE: ICD-10-CM

## 2020-09-30 LAB
SARS-COV-2 RNA SPEC QL NAA+PROBE: NOT DETECTED
SPECIMEN SOURCE: NORMAL

## 2020-10-09 DIAGNOSIS — Z11.59 SPECIAL SCREENING EXAMINATION FOR VIRAL DISEASE: ICD-10-CM

## 2020-10-09 PROCEDURE — U0003 INFECTIOUS AGENT DETECTION BY NUCLEIC ACID (DNA OR RNA); SEVERE ACUTE RESPIRATORY SYNDROME CORONAVIRUS 2 (SARS-COV-2) (CORONAVIRUS DISEASE [COVID-19]), AMPLIFIED PROBE TECHNIQUE, MAKING USE OF HIGH THROUGHPUT TECHNOLOGIES AS DESCRIBED BY CMS-2020-01-R: HCPCS | Mod: 90 | Performed by: PATHOLOGY

## 2020-10-09 PROCEDURE — 99000 SPECIMEN HANDLING OFFICE-LAB: CPT | Performed by: PATHOLOGY

## 2020-10-10 LAB
LABORATORY COMMENT REPORT: NORMAL
SARS-COV-2 RNA SPEC QL NAA+PROBE: NORMAL
SARS-COV-2 RNA SPEC QL NAA+PROBE: NORMAL
SARS-COV-2 RNA SPEC QL NAA+PROBE: NOT DETECTED
SPECIMEN SOURCE: NORMAL

## 2020-10-12 ENCOUNTER — DOCUMENTATION ONLY (OUTPATIENT)
Dept: ORTHOPEDICS | Facility: CLINIC | Age: 25
End: 2020-10-12

## 2020-10-12 NOTE — PROGRESS NOTES
"[facility] ATHLETIC MEDICINE  [facility]   Nutrition Note    Date of service: 8/5/2020    Assessment:  Gained 30lbs last spring (2019) over 3 month span. Diagnosed with PCOS in 2018. Mom has Hashimoto disease so was wondering if she too has it. Feels better when she is \"off\" gluten and dairy. Thyroid test done  2 nodules on thyroid, needs 1 more thyroid test?? Doesn't like meat very much but will eat it if it is cooked for her.    Today's weight: 200lbs  Today's height: n/a  BMI: n/a  ETOH: n/a  Body fat %: n/a    REE (kcal): n/a  TDEE (kcal): n/a    No results found for: HEBMP      Diagnosis  Overweight/obesity     Related to: potential overconsumption of calories or underlying medical condition     As evidenced by: current weight.      Intervention:  Provide a 3 day food log to gain information on current diet and potential areas to help in. Schedule follow up meeting with RD to continue to assess nutritional status.    Outcome goals:  Healthy weight loss approaching goal body weight/composition    Activity goals:  3 day food log    Monitoring/evaluation:  Follow up again in a week. (Athlete never did respond to requests for a follow up meeting).      Thiago Holden RD      "

## 2020-10-14 ENCOUNTER — DOCUMENTATION ONLY (OUTPATIENT)
Dept: FAMILY MEDICINE | Facility: CLINIC | Age: 25
End: 2020-10-14

## 2020-10-14 NOTE — PROGRESS NOTES
"  [facility] ATHLETIC MEDICINE  [facility]   Brief Check-in during quarantine/isolation    Due to COVID-19 pandemic, this check-in was conducted via telehealth services.     Date: 10/14/20  Duration of Call: 15 minutes  Student-athlete Name: Rodrigo Atherton     Called Rodrigo Barboza via phone to briefly check-in regarding how she is doing in isolation/quarantine. Listened and validated her experience. It's her 2nd time in quarantine. She notes \"looking at the upside\" and finding opportunity in it such as resting her knee, spending time with her dog and enjoying the Fall. She notes doing well.  Discussed possible challenges, what to expect and coping skills to use during this time. Encouraged maintaining a daily routine, staying connected with social support,  engaging in self-care activities, and reaching out for help if needed. Also shared mental health and wellness resources, and how to schedule a sport psychology session if she would like to follow-up in the near future. A follow-up email was sent to the student-athlete with a list of mental health/wellness resources and an anonymous survey for them to complete, if interested, inquiring about their experience in isolation/quarantine.         Devonte Guerrier, PhD LP, CMPC         "

## 2020-11-02 ENCOUNTER — DOCUMENTATION ONLY (OUTPATIENT)
Dept: FAMILY MEDICINE | Facility: CLINIC | Age: 25
End: 2020-11-02
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
## 2020-11-11 ENCOUNTER — VIRTUAL VISIT (OUTPATIENT)
Dept: ORTHOPEDICS | Facility: CLINIC | Age: 25
End: 2020-11-11
Payer: COMMERCIAL

## 2020-11-11 DIAGNOSIS — F41.1 GENERALIZED ANXIETY DISORDER: Primary | ICD-10-CM

## 2020-11-11 PROCEDURE — 99212 OFFICE O/P EST SF 10 MIN: CPT | Mod: 95 | Performed by: FAMILY MEDICINE

## 2020-11-11 RX ORDER — HYDROXYZINE PAMOATE 25 MG/1
25-50 CAPSULE ORAL 3 TIMES DAILY PRN
Qty: 90 CAPSULE | Refills: 0 | Status: SHIPPED | OUTPATIENT
Start: 2020-11-11

## 2020-11-11 NOTE — PROGRESS NOTES
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 Has had anxiety through high school and college. Feels like her anxiey is getting worse recently. Used to have panic episodes last year. Hasn't had any this year.  No thoughts of self harm or suicide. Has not tried any medications in the past.      PMH, Medications and Allergies were reviewed and updated as needed.    ROS:  As noted above otherwise negative.    There is no problem list on file for this patient.      Current Outpatient Medications   Medication Sig Dispense Refill     hydrOXYzine (VISTARIL) 25 MG capsule Take 1-2 capsules (25-50 mg) by mouth 3 times daily as needed for itching 90 capsule 0     sertraline (ZOLOFT) 50 MG tablet Take 1 tablet (50 mg) by mouth daily 30 tablet 0            OBJECTIVE:       Vitals: There were no vitals filed for this visit.  BMI: There is no height or weight on file to calculate BMI.    GENERAL: Healthy, alert and no distress  EYES: Eyes grossly normal to inspection.  No discharge or erythema, or obvious scleral/conjunctival abnormalities.  RESP: No audible wheeze, cough, or visible cyanosis.  No visible retractions or increased work of breathing.    SKIN: Visible skin clear. No significant rash, abnormal pigmentation or lesions.  NEURO: Cranial nerves grossly intact.  Mentation and speech appropriate for age.  PSYCH: Mentation appears normal, affect normal/bright, judgement and insight intact, normal speech and appearance well-groomed.          ASSESSMENT and PLAN:     1. Generalized anxiety disorder  - sertraline (ZOLOFT) 50 MG tablet; Take 1 tablet (50 mg) by mouth daily  Dispense: 30 tablet; Refill: 0  - hydrOXYzine (VISTARIL) 25 MG capsule; Take 1-2 capsules (25-50 mg) by mouth 3 times daily as needed for itching  Dispense: 90 capsule;  Refill: 0    Return to clinic in 2 weeks for follow up of symptoms. Return sooner if develops new or worsening symptoms.    Options for treatment and/or follow-up care were reviewed with the patient was actively involved in the decision making process. Patient verbalized understanding and was in agreement with the plan.    The patient was seen by and discussed with Dr.Suzanne MIKE Fields MD, CAQ, CCD    Sofi Torres,   Primary Care Sports Medicine Fellow      Video-Visit Details    Type of service:  Video Visit    Video Start Time: 3:40 pm     Video End Time (time video stopped): 3:50 pm    Total Visit time: 10 min    Originating Location (pt. Location): Home

## 2020-11-11 NOTE — LETTER
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 Has had anxiety through high school and college. Feels like her anxiey is getting worse recently. Used to have panic episodes last year. Hasn't had any this year.  No thoughts of self harm or suicide. Has not tried any medications in the past.      PMH, Medications and Allergies were reviewed and updated as needed.    ROS:  As noted above otherwise negative.    There is no problem list on file for this patient.      Current Outpatient Medications   Medication Sig Dispense Refill     hydrOXYzine (VISTARIL) 25 MG capsule Take 1-2 capsules (25-50 mg) by mouth 3 times daily as needed for itching 90 capsule 0     sertraline (ZOLOFT) 50 MG tablet Take 1 tablet (50 mg) by mouth daily 30 tablet 0            OBJECTIVE:       Vitals: There were no vitals filed for this visit.  BMI: There is no height or weight on file to calculate BMI.    GENERAL: Healthy, alert and no distress  EYES: Eyes grossly normal to inspection.  No discharge or erythema, or obvious scleral/conjunctival abnormalities.  RESP: No audible wheeze, cough, or visible cyanosis.  No visible retractions or increased work of breathing.    SKIN: Visible skin clear. No significant rash, abnormal pigmentation or lesions.  NEURO: Cranial nerves grossly intact.  Mentation and speech appropriate for age.  PSYCH: Mentation appears normal, affect normal/bright, judgement and insight intact, normal speech and appearance well-groomed.          ASSESSMENT and PLAN:     1. Generalized anxiety disorder  - sertraline (ZOLOFT) 50 MG tablet; Take 1 tablet (50 mg) by mouth daily  Dispense: 30 tablet; Refill: 0  - hydrOXYzine (VISTARIL) 25 MG capsule; Take 1-2 capsules  "(25-50 mg) by mouth 3 times daily as needed for itching  Dispense: 90 capsule; Refill: 0    Return to clinic in 2 weeks for follow up of symptoms. Return sooner if develops new or worsening symptoms.    Options for treatment and/or follow-up care were reviewed with the patient was actively involved in the decision making process. Patient verbalized understanding and was in agreement with the plan.    The patient was seen by and discussed with Dr.Suzanne MIKE Fields MD, CAQ, CCD    Sofi Torres,   Primary Care Sports Medicine Fellow      Video-Visit Details    Type of service:  Video Visit    Video Start Time: 3:40 pm     Video End Time (time video stopped): 3:50 pm    Total Visit time: 10 min    Originating Location (pt. Location): Home           Rodrigo Barboza is a 25 year old female who is being evaluated via a billable video visit.      The patient has been notified of following:     \"This video visit will be conducted via a call between you and your physician/provider. We have found that certain health care needs can be provided without the need for an in-person physical exam.  This service lets us provide the care you need with a video conversation.  If a prescription is necessary we can send it directly to your pharmacy.  If lab work is needed we can place an order for that and you can then stop by our lab to have the test done at a later time.    Video visits are billed at different rates depending on your insurance coverage.  Please reach out to your insurance provider with any questions.    If during the course of the call the physician/provider feels a video visit is not appropriate, you will not be charged for this service.\"    Patient has given verbal consent for Video visit? Yes  How would you like to obtain your AVS? MyChart  Will anyone else be joining your video visit? Yes, ATC            Attending Note:   I have talked with this patient along with Dr. Torres via video visit and have reviewed the " clinical presentation and watched the video examination with the fellow. I agree with the treatment plan as outlined. The plan was formulated with the fellow on the day of the patient's visit.   Reina Fields MD, CAQ, CCD  [facility]  Sports Medicine and Bone Health        Reina Fields MD

## 2020-11-11 NOTE — PROGRESS NOTES
"Rodrigo Barboza is a 25 year old female who is being evaluated via a billable video visit.      The patient has been notified of following:     \"This video visit will be conducted via a call between you and your physician/provider. We have found that certain health care needs can be provided without the need for an in-person physical exam.  This service lets us provide the care you need with a video conversation.  If a prescription is necessary we can send it directly to your pharmacy.  If lab work is needed we can place an order for that and you can then stop by our lab to have the test done at a later time.    Video visits are billed at different rates depending on your insurance coverage.  Please reach out to your insurance provider with any questions.    If during the course of the call the physician/provider feels a video visit is not appropriate, you will not be charged for this service.\"    Patient has given verbal consent for Video visit? Yes  How would you like to obtain your AVS? MyChart  Will anyone else be joining your video visit? Yes, ATC          "

## 2020-11-12 ENCOUNTER — DOCUMENTATION ONLY (OUTPATIENT)
Dept: FAMILY MEDICINE | Facility: CLINIC | Age: 25
End: 2020-11-12
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
## 2020-11-14 NOTE — PROGRESS NOTES
Attending Note:   I have talked with this patient along with Dr. Torres via video visit and have reviewed the clinical presentation and watched the video examination with the fellow. I agree with the treatment plan as outlined. The plan was formulated with the fellow on the day of the patient's visit.   Reina Fields MD, CAQ, CCD  [facility]  Sports Medicine and Bone Health

## 2020-11-23 ENCOUNTER — DOCUMENTATION ONLY (OUTPATIENT)
Dept: FAMILY MEDICINE | Facility: CLINIC | Age: 25
End: 2020-11-23
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
## 2020-12-01 ENCOUNTER — OFFICE VISIT (OUTPATIENT)
Dept: FAMILY MEDICINE | Facility: CLINIC | Age: 25
End: 2020-12-01
Payer: COMMERCIAL

## 2020-12-01 VITALS
BODY MASS INDEX: 32.64 KG/M2 | HEIGHT: 70 IN | SYSTOLIC BLOOD PRESSURE: 123 MMHG | DIASTOLIC BLOOD PRESSURE: 72 MMHG | HEART RATE: 99 BPM | WEIGHT: 228 LBS

## 2020-12-01 DIAGNOSIS — F41.9 ANXIETY: ICD-10-CM

## 2020-12-01 DIAGNOSIS — F32.A DEPRESSION, UNSPECIFIED DEPRESSION TYPE: Primary | ICD-10-CM

## 2020-12-01 RX ORDER — ESCITALOPRAM OXALATE 20 MG/1
10 TABLET ORAL DAILY
Qty: 15 TABLET | Refills: 0 | Status: SHIPPED | OUTPATIENT
Start: 2020-12-01 | End: 2020-12-01

## 2020-12-01 RX ORDER — ESCITALOPRAM OXALATE 20 MG/1
10 TABLET ORAL DAILY
Qty: 30 TABLET | Refills: 0 | Status: SHIPPED | OUTPATIENT
Start: 2020-12-01

## 2020-12-01 ASSESSMENT — MIFFLIN-ST. JEOR: SCORE: 1869.45

## 2020-12-01 NOTE — LETTER
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 Has had anxiety through high school and college. Feels like her anxiey is getting worse recently. Used to have panic episodes last year. Hasn't had any this year.  No thoughts of self harm or suicide. Has not tried any medications in the past.    Update: After starting sertraline developed nausea, vomiting, diarrhea and rash. Reduced down to 25 mg and symptoms improved. Tried increasing back to 50 mg this past week and symptoms returned. Is taking vistaril intermittently without difficulty.    PMH, Medications and Allergies were reviewed and updated as needed.    ROS:  As noted above otherwise negative.    There is no problem list on file for this patient.      Current Outpatient Medications   Medication Sig Dispense Refill     escitalopram (LEXAPRO) 20 MG tablet Take 0.5 tablets (10 mg) by mouth daily 15 tablet 0     hydrOXYzine (VISTARIL) 25 MG capsule Take 1-2 capsules (25-50 mg) by mouth 3 times daily as needed for itching 90 capsule 0              OBJECTIVE:     Vitals:   Vitals:    12/01/20 1018   BP: 123/72   Pulse: 99   Weight: 103.4 kg (228 lb)   Height: 1.778 m (5' 10\")     BMI: Body mass index is 32.71 kg/m .    Gen:  Well nourished and in no acute distress  HEENT: Extraocular movement intact.  Neck: supple  Skin: No rash, erythema, ecchymosis or obvious abnormality  Psych: Euthymic   Neuro: Alert and oriented.            ASSESSMENT & PLAN:      1. Depression, unspecified depression type  2. Anxiety  Was emotionally feeling improvement on sertraline however she experienced GI symptoms and rash as noted above.  - Due to above reaction will discontinue sertraline and start lexapro  - Start at 10 mg  - May " continue PRN Vistaril   - If similar reaction on excitalopram will consider medication outside selective serotonin reuptake inhibitor family. Ie wellbutrin  - escitalopram (LEXAPRO) 20 MG tablet; Take 0.5 tablets (10 mg) by mouth daily  Dispense: 30 tablet; Refill: 0    Check in with Max ATc over the next 2 weeks to discuss how she is tolerating medication. Return to clinic at the beginning of January following break. Return sooner if develops new or worsening symptoms.    Options for treatment and/or follow-up care were reviewed with the patient and . Patient was actively involved in the decision making process. Patient verbalized understanding and was in agreement with the plan.    The patient was seen by and discussed with Dr.Suzanne MIKE Fields MD, CAQ, CCD    Sofi Torres DO  Primary Care Sports Medicine Fellow      Attending Note:   I have personally examined this patient and have reviewed the clinical presentation and progress note with the fellow. I agree with the treatment plan as outlined. The plan was formulated with the fellow on the day of the patient's visit. I have reviewed all imaging with the fellow and agree with the findings in the documentation.  Also noted a rash flat and red on her cheeks.    > 25 min of total time spent in one-on-one evalution and discussion with patient regarding nature of problem, course, prior treatments, and therapeutic options,> 50% of which was spent in counseling and coordination of care:    Reina Fields MD, CAQ, CCD  [facility]  Sports Medicine and Bone Health      Reina Fields MD

## 2020-12-01 NOTE — PROGRESS NOTES
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 Has had anxiety through high school and college. Feels like her anxiey is getting worse recently. Used to have panic episodes last year. Hasn't had any this year.  No thoughts of self harm or suicide. Has not tried any medications in the past.    Update: After starting sertraline developed nausea, vomiting, diarrhea and rash. Reduced down to 25 mg and symptoms improved. Tried increasing back to 50 mg this past week and symptoms returned. Is taking vistaril intermittently without difficulty.    PMH, Medications and Allergies were reviewed and updated as needed.    ROS:  As noted above otherwise negative.    There is no problem list on file for this patient.      Current Outpatient Medications   Medication Sig Dispense Refill     escitalopram (LEXAPRO) 20 MG tablet Take 0.5 tablets (10 mg) by mouth daily 15 tablet 0     hydrOXYzine (VISTARIL) 25 MG capsule Take 1-2 capsules (25-50 mg) by mouth 3 times daily as needed for itching 90 capsule 0              OBJECTIVE:     Vitals:   Vitals:    12/01/20 1018   BP: 123/72   Pulse: 99   Weight: 103.4 kg (228 lb)   Height: 1.778 m (5' 10\")     BMI: Body mass index is 32.71 kg/m .    Gen:  Well nourished and in no acute distress  HEENT: Extraocular movement intact.  Neck: supple  Skin: No rash, erythema, ecchymosis or obvious abnormality  Psych: Euthymic   Neuro: Alert and oriented.            ASSESSMENT & PLAN:      1. Depression, unspecified depression type  2. Anxiety  Was emotionally feeling improvement on sertraline however she experienced GI symptoms and rash as noted above.  - Due to above reaction will discontinue sertraline and start lexapro  - Start at 10 mg  - May continue PRN Vistaril   - If similar reaction on excitalopram will consider " medication outside selective serotonin reuptake inhibitor family. Ie wellbutrin  - escitalopram (LEXAPRO) 20 MG tablet; Take 0.5 tablets (10 mg) by mouth daily  Dispense: 30 tablet; Refill: 0    Check in with Max Moore over the next 2 weeks to discuss how she is tolerating medication. Return to clinic at the beginning of January following break. Return sooner if develops new or worsening symptoms.    Options for treatment and/or follow-up care were reviewed with the patient and . Patient was actively involved in the decision making process. Patient verbalized understanding and was in agreement with the plan.    The patient was seen by and discussed with Dr.Suzanne MIKE Fields MD, CAQ, CCD    Sofi Torres DO  Primary Care Sports Medicine Fellow

## 2020-12-01 NOTE — PROGRESS NOTES
Attending Note:   I have personally examined this patient and have reviewed the clinical presentation and progress note with the fellow. I agree with the treatment plan as outlined. The plan was formulated with the fellow on the day of the patient's visit. I have reviewed all imaging with the fellow and agree with the findings in the documentation.  Also noted a rash flat and red on her cheeks.    > 25 min of total time spent in one-on-one evalution and discussion with patient regarding nature of problem, course, prior treatments, and therapeutic options,> 50% of which was spent in counseling and coordination of care:    Reina Fields MD, CAQ, CCD  [facility]  Sports Medicine and Bone Health

## 2021-01-03 ENCOUNTER — DOCUMENTATION ONLY (OUTPATIENT)
Dept: FAMILY MEDICINE | Facility: CLINIC | Age: 26
End: 2021-01-03

## 2021-01-03 NOTE — PROGRESS NOTES
[facility] ATC follow-up note  Date of service performed: 01/12/2021    Concern/injury: GI distress/acid reflux    Assessment/plan: Pt has been taking Prilosec since 1/5/21 and has reported no further problems with diarrhea and/or vomiting.  Plan is to continue with medication and monitor diet for food sensitivity.   Follow-up with physician if problem persisits. Pt understands this plan and has no questions at this time.    RBMARGARITA Jaffe ATC      [facility] ATC initial assessment note  Date of service performed: 01/03/2021    Concern: Acute illness  Body part: GI distress  Description: GI distress  Injury: N/A  Type: Non-athletics related  Date of injury: 01/03/2021    S: Pt reported to the ATR this morning following a recovery session in the gym that included stretching and foam rolling c/o feeling nauseous.  She then went to the bathroom and reported that she had vomited.  Pt denies eating any new foods or feeling sick prior to the recovery session.  Pt denies any other symptoms.     O: Pt is alert and orientated to PPT.  She shows no outward signs of physical or mental distress.      A: Gastro-intestinal distress    P: Sent home for the rest of the day.  She has an appointment to see Dr. Fields tomorrow, 1/4/2021, in the [facility] for evaluation.      Max Jaffe ATC

## 2021-01-04 ENCOUNTER — HEALTH MAINTENANCE LETTER (OUTPATIENT)
Age: 26
End: 2021-01-04

## 2021-01-04 ENCOUNTER — OFFICE VISIT (OUTPATIENT)
Dept: FAMILY MEDICINE | Facility: CLINIC | Age: 26
End: 2021-01-04
Payer: COMMERCIAL

## 2021-01-04 VITALS
WEIGHT: 225.9 LBS | HEART RATE: 83 BPM | TEMPERATURE: 98.4 F | BODY MASS INDEX: 33.46 KG/M2 | DIASTOLIC BLOOD PRESSURE: 82 MMHG | SYSTOLIC BLOOD PRESSURE: 130 MMHG | HEIGHT: 69 IN

## 2021-01-04 DIAGNOSIS — R10.13 EPIGASTRIC PAIN: Primary | ICD-10-CM

## 2021-01-04 DIAGNOSIS — R19.7 DIARRHEA, UNSPECIFIED TYPE: ICD-10-CM

## 2021-01-04 ASSESSMENT — MIFFLIN-ST. JEOR: SCORE: 1844.06

## 2021-01-04 NOTE — LETTER
"  1/4/2021      RE: Rodrigo Barboza  [address] [address]       S: 24 yo  female  here for recurrent episodes of vomiting and diarrhea.  See previous notes.      Episodes of vomiting and diarrhea that last about 3 days.  This episode seems less intense then before.  Nausea too.  No pain.  Stomach feels uncomfortable.  Last episode was yesterday.  Hasn't noticed any type of pattern of foods that bother her.  She typically stays away from gluten and dairy.  Had extensive work-up and evaluation in  for these problems without identification of an etiology.  Never tried proton pump inhibitors.  Denies NSAIDs, etoh, caffeine.  Wt stable. No blood in stool or emesis.  Not dark colored either.  Denies eating any unusual foods.  No one else is sick. No fever.      MANPREET:  Lexapro is going well.  Feels overall less anxiety.      Fam Hx:  Mom with Hashimoto's    Current Outpatient Medications   Medication     Omeprazole ([employer] OMEPRAZOLE) 20 MG TBDD     escitalopram (LEXAPRO) 20 MG tablet     hydrOXYzine (VISTARIL) 25 MG capsule     No current facility-administered medications for this visit.      O: NAD  /82   Pulse 83   Temp 98.4  F (36.9  C)   Ht 1.753 m (5' 9\")   Wt 102.5 kg (225 lb 14.4 oz)   LMP 12/04/2020 (Exact Date)   BMI 33.36 kg/m    HEENT: neg  Neck: no lad  Lungs; cta  Heart; rrr  Abd:  Soft and nttp except mild epigastric ttp.  No g/r.  No HSM      A:  Recurrent vomiting and diarrhea in a   with MANPREET    P:  Check labs  Trial of omeprazole along with Zofran.   RTC in 3-4 weeks.      Sofi Bhatia, Sports Medicine Fellow saw and examined the patient as well.      Max Graves, ATC for  [employer] was present.      Reina Fields MD, CAQ, FACSM, CCD  [facility]  Sports Medicine and Bone Health  Team Physician;  Athletics          Reina Fiedls MD    "

## 2021-01-04 NOTE — PROGRESS NOTES
"S: 26 yo  female  here for recurrent episodes of vomiting and diarrhea.  See previous notes.      Episodes of vomiting and diarrhea that last about 3 days.  This episode seems less intense then before.  Nausea too.  No pain.  Stomach feels uncomfortable.  Last episode was yesterday.  Hasn't noticed any type of pattern of foods that bother her.  She typically stays away from gluten and dairy.  Had extensive work-up and evaluation in  for these problems without identification of an etiology.  Never tried proton pump inhibitors.  Denies NSAIDs, etoh, caffeine.  Wt stable. No blood in stool or emesis.  Not dark colored either.  Denies eating any unusual foods.  No one else is sick. No fever.      MANPREET:  Lexapro is going well.  Feels overall less anxiety.      Fam Hx:  Mom with Hashimoto's    Current Outpatient Medications   Medication     Omeprazole ([employer] OMEPRAZOLE) 20 MG TBDD     escitalopram (LEXAPRO) 20 MG tablet     hydrOXYzine (VISTARIL) 25 MG capsule     No current facility-administered medications for this visit.      O: NAD  /82   Pulse 83   Temp 98.4  F (36.9  C)   Ht 1.753 m (5' 9\")   Wt 102.5 kg (225 lb 14.4 oz)   LMP 12/04/2020 (Exact Date)   BMI 33.36 kg/m    HEENT: neg  Neck: no lad  Lungs; cta  Heart; rrr  Abd:  Soft and nttp except mild epigastric ttp.  No g/r.  No HSM      A:  Recurrent vomiting and diarrhea in a   with MANPREET    P:  Check labs  Trial of omeprazole along with Zofran.   RTC in 3-4 weeks.      Sofi Bhatia, Sports Medicine Fellow saw and examined the patient as well.      Max Graves, KRISTIN for  [employer] was present.      Reina Fields MD, CAQ, FACSM, CCD  [facility]  Sports Medicine and Bone Health  Team Physician;  Athletics      "

## 2021-01-11 ENCOUNTER — DOCUMENTATION ONLY (OUTPATIENT)
Dept: FAMILY MEDICINE | Facility: CLINIC | Age: 26
End: 2021-01-11

## 2021-01-12 ENCOUNTER — OFFICE VISIT (OUTPATIENT)
Dept: FAMILY MEDICINE | Facility: CLINIC | Age: 26
End: 2021-01-12
Payer: COMMERCIAL

## 2021-01-12 VITALS
DIASTOLIC BLOOD PRESSURE: 82 MMHG | HEIGHT: 70 IN | BODY MASS INDEX: 32.34 KG/M2 | WEIGHT: 225.9 LBS | SYSTOLIC BLOOD PRESSURE: 129 MMHG | HEART RATE: 105 BPM

## 2021-01-12 DIAGNOSIS — R19.7 DIARRHEA, UNSPECIFIED TYPE: ICD-10-CM

## 2021-01-12 DIAGNOSIS — S06.0X0A CONCUSSION WITHOUT LOSS OF CONSCIOUSNESS, INITIAL ENCOUNTER: Primary | ICD-10-CM

## 2021-01-12 DIAGNOSIS — R10.13 EPIGASTRIC PAIN: ICD-10-CM

## 2021-01-12 LAB
ALBUMIN SERPL-MCNC: 4 G/DL (ref 3.4–5)
ALP SERPL-CCNC: 107 U/L (ref 40–150)
ALT SERPL W P-5'-P-CCNC: 25 U/L (ref 0–50)
AMYLASE SERPL-CCNC: 42 U/L (ref 30–110)
ANION GAP SERPL CALCULATED.3IONS-SCNC: 3 MMOL/L (ref 3–14)
AST SERPL W P-5'-P-CCNC: 18 U/L (ref 0–45)
BASOPHILS # BLD AUTO: 0 10E9/L (ref 0–0.2)
BASOPHILS NFR BLD AUTO: 0.5 %
BILIRUB SERPL-MCNC: 0.4 MG/DL (ref 0.2–1.3)
BUN SERPL-MCNC: 12 MG/DL (ref 7–30)
CALCIUM SERPL-MCNC: 9 MG/DL (ref 8.5–10.1)
CHLORIDE SERPL-SCNC: 108 MMOL/L (ref 94–109)
CO2 SERPL-SCNC: 28 MMOL/L (ref 20–32)
CREAT SERPL-MCNC: 0.82 MG/DL (ref 0.52–1.04)
DIFFERENTIAL METHOD BLD: NORMAL
EOSINOPHIL # BLD AUTO: 0.2 10E9/L (ref 0–0.7)
EOSINOPHIL NFR BLD AUTO: 2.1 %
ERYTHROCYTE [DISTWIDTH] IN BLOOD BY AUTOMATED COUNT: 12.7 % (ref 10–15)
ERYTHROCYTE [SEDIMENTATION RATE] IN BLOOD BY WESTERGREN METHOD: 6 MM/H (ref 0–20)
GFR SERPL CREATININE-BSD FRML MDRD: >90 ML/MIN/{1.73_M2}
GLUCOSE SERPL-MCNC: 104 MG/DL (ref 70–99)
HCT VFR BLD AUTO: 39.9 % (ref 35–47)
HGB BLD-MCNC: 13.1 G/DL (ref 11.7–15.7)
IMM GRANULOCYTES # BLD: 0 10E9/L (ref 0–0.4)
IMM GRANULOCYTES NFR BLD: 0.2 %
LIPASE SERPL-CCNC: 104 U/L (ref 73–393)
LYMPHOCYTES # BLD AUTO: 3.3 10E9/L (ref 0.8–5.3)
LYMPHOCYTES NFR BLD AUTO: 37.4 %
MCH RBC QN AUTO: 29 PG (ref 26.5–33)
MCHC RBC AUTO-ENTMCNC: 32.8 G/DL (ref 31.5–36.5)
MCV RBC AUTO: 89 FL (ref 78–100)
MONOCYTES # BLD AUTO: 0.7 10E9/L (ref 0–1.3)
MONOCYTES NFR BLD AUTO: 7.8 %
NEUTROPHILS # BLD AUTO: 4.5 10E9/L (ref 1.6–8.3)
NEUTROPHILS NFR BLD AUTO: 52 %
NRBC # BLD AUTO: 0 10*3/UL
NRBC BLD AUTO-RTO: 0 /100
PLATELET # BLD AUTO: 292 10E9/L (ref 150–450)
POTASSIUM SERPL-SCNC: 4.1 MMOL/L (ref 3.4–5.3)
PROT SERPL-MCNC: 7.2 G/DL (ref 6.8–8.8)
RBC # BLD AUTO: 4.51 10E12/L (ref 3.8–5.2)
SODIUM SERPL-SCNC: 139 MMOL/L (ref 133–144)
WBC # BLD AUTO: 8.7 10E9/L (ref 4–11)

## 2021-01-12 PROCEDURE — 80053 COMPREHEN METABOLIC PANEL: CPT | Performed by: PATHOLOGY

## 2021-01-12 PROCEDURE — 36415 COLL VENOUS BLD VENIPUNCTURE: CPT | Performed by: PATHOLOGY

## 2021-01-12 PROCEDURE — 85652 RBC SED RATE AUTOMATED: CPT | Performed by: PATHOLOGY

## 2021-01-12 PROCEDURE — 82150 ASSAY OF AMYLASE: CPT | Performed by: PATHOLOGY

## 2021-01-12 PROCEDURE — 85025 COMPLETE CBC W/AUTO DIFF WBC: CPT | Performed by: PATHOLOGY

## 2021-01-12 PROCEDURE — 83690 ASSAY OF LIPASE: CPT | Performed by: PATHOLOGY

## 2021-01-12 ASSESSMENT — MIFFLIN-ST. JEOR: SCORE: 1859.93
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
## 2021-01-12 NOTE — LETTER
"  1/12/2021      RE: Rodrigo Barboza  [address] [address]       [facility]            SUBJECTIVE:     Rodrigo Barboza is a 25 year old female UM  presenting to clinic today with a chief complaint of possible concussion after hitting her head R posterior on the weight room rack when she stood up. +HA, fogginess, difficulty concentrating, +Vertigo when trying to sleep.  +HA.  Slept well last night and took some extra naps.  Mild light and sound sensitivity. No nausea or vomiting. Took some Advil yesterday for her knee and that helped.  DOI: 1/11/2021  Concussion hx: x3 Nov 2019.  Missed 5 days for her last concussion.  No learning disabilities.     PMH, Medications and Allergies were reviewed and updated as needed.    ROS:  As noted above otherwise negative.    There is no problem list on file for this patient.      Current Outpatient Medications   Medication Sig Dispense Refill     escitalopram (LEXAPRO) 20 MG tablet Take 0.5 tablets (10 mg) by mouth daily 30 tablet 0     hydrOXYzine (VISTARIL) 25 MG capsule Take 1-2 capsules (25-50 mg) by mouth 3 times daily as needed for itching 90 capsule 0     Omeprazole ([employer] OMEPRAZOLE) 20 MG TBDD Take 40 mg by mouth daily 60 tablet 0              OBJECTIVE:     Vitals:   Vitals:    01/12/21 0834   BP: 129/82   Pulse: 105   Weight: 102.5 kg (225 lb 14.4 oz)   Height: 1.778 m (5' 10\")     BMI: Body mass index is 32.41 kg/m .    Gen:  Well nourished and in no acute distress  HEENT: Extraocular movement intact.  Neck: supple, nttp  Psych: Euthymic   Neuro: Alert and oriented.     Neuro:  CN 2-12 intact  VOMS:  NPC 1 cm  EOMI: WNL  Saccades:  Normal horizontal and vertical but increasing HA  VOR: horizontal:  Mild dizziness  Vertical:  WNL  Thumb tracking:  WNL but mild dizziness  Rhomberg:  WNL         ASSESSMENT & PLAN:    Concussion  Mild vestibular-ocular dysfunction    RTL: Level 3  No training " today.  May try light exertion on exercise bike tomorrow if feeling better than today.   Avoid NSAIDS.  Ok to use Tylenol.     Return to clinic per ATC.     Tobias Jackson DO, Primary Care Sports Medicine Fellow, also saw and examined the patient.     Total time: 25 minutes    This patient was managed according to the  Athletic Medicine Concussion Protocol.      Reina Fields MD, CAQ, FACSM, CCD  [facility]  Sports Medicine and Bone Health  Team Physician;  Athletics          Reina Fields MD

## 2021-01-20 NOTE — CONFIDENTIAL NOTE
[facility] ATC follow-up note  Date of service performed: 02/22/2021    Concern/injury: Concussion DOI: 01/11/2021    Assessment/plan: Pt did not attend her scheduled appointment with Dr. Fields on 02/02/2021 in the [facility].  Pt states that she overslept and forgot about the appointment.  Pt does not wish to see Dr. Fields and does not want to reschedule the appointment.    SARAH Jaffe ATC      [facility] ATC follow-up note  Date of service performed: 02/01/2021    Concern/injury: Concussion DOI: 01/11/2021    Assessment:  Pt has been progressing through the RTP protocol and has been asymptomatic since 01/24/2021.  She completed a stationary bike workout on 01/26/21, participated in the scheduled team lift in the weight room on 01/27/2021, participated in VB practice activity (no live VB, 6v6 activity) on 01/28/2021 and participated in full VB practice activity on 01/29/21 in addition to participating in game warm-up activity on 01/29/21 at the [facility] that evening.  Pt reported no symptoms before, during or after those activities.  Pt took the ImPact test on 01/29/2021 and those results were sent to Dr. Fields along with her ImPact Baseline testing results via email on 01/29/2021.  Per a phone conversation with Dr. Fields on 01/30/2021, pt was cleared for full participation and would be available for game play if needed.  Pt completed the SCAT5 on 01/30/2021 and the results were compared with her baseline exam.      Plan: Pt will see Dr. Fields on 02/02/2021 in the [facility] for a final appointment.  The [facility] Concussion Management Plan was followed for this injury.    SARAH Jaffe ATC      [facility] ATC follow-up note  Date of service performed: 01/22/2021    Concern/injury: Concussion DOI: 01/11/2021    Assessment:  Pt reports to the ATR for follow-up care regarding  "a concussion she sustained on 01/11/2021 in the weight room.  She has been seen in the ATR for the past 2 days.  Pt completed a symptom inventory on 1/21/2021 and today and the results are as follows:  01/21/2021:  Headache = 1  Sensitivity to light = 1  Total # of symptoms = 2  Total symptom score = 2    01/22/2021  Sensitivity to light = 1  Total # of symptoms = 1  Total symptom score = 1    Pt states that she no longer has a constant headache.  She states that she is headache-free when she is indoors and that a \"slight\" headache returns when she is outside is bright sunlight.      Plan:  Continue cognitive and physical rest.  Pt participated in the team video session today without incident.  She is able to observe practice this afternoon and is tolerating this activity without incident.  I plan to have her complete a 30 minute light-intensity stationary bike workout tomorrow morning.  She is scheduled to see Dr. Fields for a follow-up visit in the [facility] on 01/26/2021 at 1:20 PM.      SARAH Jaffe ATC        [facility] ATHLETICS  [facility] ATC follow-up note  Date of service performed: 01/20/2021    Concern/injury: Concussion DOI: 01/11/2021    Assessment: Pt reports to the [facility] for follow-up care regarding a concussion she sustained on 01/11/2021 in the weight room.  Pt states that she continues to feel better everyday but still c/o persistent headache.  Pt completed a symptom inventory and the results are as follows:   Headache = 1  Sensitivity to light = 1  Total # of symptoms = 2  Total symptom score = 2  Pt completed a 20 minute low-intensity stationary bike workout this afternoon and reported a slight increase in her headache pain upon the conclusion of the ride.  Pt then watched approximately 10 minutes of VB practice and then removed herself after she reported an increase in her headache pain.  She spend the rest of the practice time in the team recovery room " resting.    Plan: Continue with cognitive and physical rest.  Pt will see Dr. Fields for a follow-up visit on Tuesday January 26, 2021 at 10:40 AM in the [facility].  I will continue to monitor her symptoms and will proceed per the [facility] Concussion Management Protocol.    SARAH Jaffe, ATC

## 2021-01-26 ENCOUNTER — OFFICE VISIT (OUTPATIENT)
Dept: FAMILY MEDICINE | Facility: CLINIC | Age: 26
End: 2021-01-26
Payer: COMMERCIAL

## 2021-01-26 VITALS
WEIGHT: 220 LBS | HEART RATE: 101 BPM | HEIGHT: 70 IN | BODY MASS INDEX: 31.5 KG/M2 | SYSTOLIC BLOOD PRESSURE: 136 MMHG | DIASTOLIC BLOOD PRESSURE: 88 MMHG

## 2021-01-26 DIAGNOSIS — S06.0X0D CONCUSSION WITHOUT LOSS OF CONSCIOUSNESS, SUBSEQUENT ENCOUNTER: Primary | ICD-10-CM

## 2021-01-26 ASSESSMENT — MIFFLIN-ST. JEOR: SCORE: 1833.16

## 2021-01-26 NOTE — PROGRESS NOTES
"[facility]            SUBJECTIVE:   Rodrigo Barboza is a 25 year old female   presenting to clinic today for f/u of a concussion after hitting her head R posterior on the weight room rack when she stood up. +HA, fogginess, difficulty concentrating, +Vertigo when trying to sleep.   DOI: 1/11/2021    Interval Hx: Feels well with her only symptom being a slight amount of light sensitivity when going outside in the bright sun and white snow.  Otherwise has been feeling well and has participated in some easy passing, running, some jumping, lifting.  Classes are going well without any symptoms.  Was increased to RTL Level 4 and then 5.  Sleeping well.      Current Outpatient Medications   Medication     escitalopram (LEXAPRO) 20 MG tablet     hydrOXYzine (VISTARIL) 25 MG capsule     Omeprazole ([employer]S OMEPRAZOLE) 20 MG TBDD     No current facility-administered medications for this visit.      PMH, Medications and Allergies were reviewed and updated.  No change since her last visit               OBJECTIVE:   /88   Pulse 101   Ht 1.778 m (5' 10\")   Wt 99.8 kg (220 lb)   LMP 01/06/2021   BMI 31.57 kg/m      Gen:  Well nourished and in no acute distress  HEENT: Extraocular movement intact.  Neck: supple  Psych: Euthymic   Neuro: Alert and oriented.  VOMS  -Smooth Pursuits:WNL  -Horizontal Saccades:: WNL  -Vertical Saccades: WNL  -Convergence: to tip of nose  -VOR (Horizontal):Negative  -VOR (Vertical): Negative  -Visual Motion Sensitivity Test::  WNL     Balance:  Single leg and tandem with eyes closed:  WNL           ASSESSMENT & PLAN:      Diagnoses and all orders for this visit:    Concussion without loss of consciousness, subsequent encounter    RTL: Level 5  -Ok to advance activities on RTP and see how she does.    -Take ImPACT if able to advance thru RTP prior to full clearance and match play.       Options for treatment and/or follow-up care were " reviewed with the patient and , Max Graves, ATC Patient was actively involved in the decision making process. Patient verbalized understanding and was in agreement with the plan.    This patient was managed according to the  Athletic Medicine Concussion Management Protocol.       Ryan Jackson DO, Sports Medicine Fellow saw and examined the patient as well.       Reina Fields MD, CAQ, FACSM, CCD  [facility]  Sports Medicine and Bone Health  Team Physician;  Athletics

## 2021-01-26 NOTE — LETTER
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 +HA, fogginess, difficulty concentrating, +Vertigo when trying to sleep.   DOI: 1/11/2021    Interval Hx: Feels well with her only symptom being a slight amount of light sensitivity when going outside in the bright sun and white snow.  Otherwise has been feeling well and has participated in some easy passing, running, some jumping, lifting.  Classes are going well without any symptoms.  Was increased to RTL Level 4 and then 5.  Sleeping well.      Current Outpatient Medications   Medication     escitalopram (LEXAPRO) 20 MG tablet     hydrOXYzine (VISTARIL) 25 MG capsule     Omeprazole ([employer]S OMEPRAZOLE) 20 MG TBDD     No current facility-administered medications for this visit.      PMH, Medications and Allergies were reviewed and updated.  No change since her last visit               OBJECTIVE:   /88   Pulse 101   Ht 1.778 m (5' 10\")   Wt 99.8 kg (220 lb)   LMP 01/06/2021   BMI 31.57 kg/m      Gen:  Well nourished and in no acute distress  HEENT: Extraocular movement intact.  Neck: supple  Psych: Euthymic   Neuro: Alert and oriented.  VOMS  -Smooth Pursuits:WNL  -Horizontal Saccades:: WNL  -Vertical Saccades: WNL  -Convergence: to tip of nose  -VOR (Horizontal):Negative  -VOR (Vertical): Negative  -Visual Motion Sensitivity Test::  WNL     Balance:  Single leg and tandem with eyes closed:  WNL           ASSESSMENT & PLAN:      Diagnoses and all orders for this visit:    Concussion without loss of consciousness, subsequent encounter    RTL: Level 5  -Ok to advance activities on RTP and see how she does.    -Take ImPACT if able to advance thru RTP prior to full " clearance and match play.       Options for treatment and/or follow-up care were reviewed with the patient and , Max Graves, ATC Patient was actively involved in the decision making process. Patient verbalized understanding and was in agreement with the plan.    This patient was managed according to the  Athletic Medicine Concussion Management Protocol.       Ryan Jackson DO, Sports Medicine Fellow saw and examined the patient as well.       Reina Fields MD, CAQ, FACSM, CCD  [facility]  Sports Medicine and Bone Health  Team Physician;  Athletics          Reina Fields MD

## 2021-02-08 ENCOUNTER — APPOINTMENT (OUTPATIENT)
Dept: FAMILY MEDICINE | Facility: CLINIC | Age: 26
End: 2021-02-08
Payer: COMMERCIAL

## 2021-02-08 NOTE — PROGRESS NOTES
[facility]            SUBJECTIVE:     Rodrigo Barboza is a 25 year old female with a PMH of *** presenting to clinic today with a chief complaint of ***.    PMH, Medications and Allergies were reviewed and updated as needed.    ROS:  As noted above otherwise negative.    There is no problem list on file for this patient.      Current Outpatient Medications   Medication Sig Dispense Refill     escitalopram (LEXAPRO) 20 MG tablet Take 0.5 tablets (10 mg) by mouth daily 30 tablet 0     hydrOXYzine (VISTARIL) 25 MG capsule Take 1-2 capsules (25-50 mg) by mouth 3 times daily as needed for itching 90 capsule 0     Omeprazole ([employer]S OMEPRAZOLE) 20 MG TBDD Take 40 mg by mouth daily 60 tablet 0              OBJECTIVE:     Vitals: There were no vitals filed for this visit.  BMI: There is no height or weight on file to calculate BMI.    Gen:  Well nourished and in no acute distress  HEENT: Extraocular movement intact.  Neck: supple  Skin: No rash, erythema, ecchymosis or obvious abnormality  Psych: Euthymic   Neuro: Alert and oriented.    MSK: ***            ASSESSMENT & PLAN:      There are no diagnoses linked to this encounter.    Return to clinic in *** for follow up of ***. Return sooner if develops new or worsening symptoms.    Options for treatment and/or follow-up care were reviewed with the patient and , ***. Patient was actively involved in the decision making process. Patient verbalized understanding and was in agreement with the plan.    The patient was seen by and discussed with Dr.Suzanne MIKE Fields MD, CAQ, CCD    Sofi Torres DO  Primary Care Sports Medicine Fellow

## 2021-03-02 ENCOUNTER — OFFICE VISIT (OUTPATIENT)
Dept: FAMILY MEDICINE | Facility: CLINIC | Age: 26
End: 2021-03-02
Payer: COMMERCIAL

## 2021-03-02 VITALS — HEART RATE: 86 BPM | DIASTOLIC BLOOD PRESSURE: 80 MMHG | SYSTOLIC BLOOD PRESSURE: 133 MMHG

## 2021-03-02 DIAGNOSIS — R42 LIGHTHEADEDNESS: Primary | ICD-10-CM

## 2021-03-02 NOTE — LETTER
3/2/2021      RE: Rodrigo Barboza  [address] [address]       S: 26 yo  female  presents with lightheadedness.  She noticed that she felt tingling around her mouth and then in her hands while walking outside.  She then felt lightheaded.  Felt a bit like a panic attack. No vertigo.  Prior to practice she continued to experience the lightheadedness.  Her ATC had her sit out.  Feeling better now.  Reports that her anxiety is doing fairly well on the Lexapro.  She did not use the vistaril.  Taking her medication daily.  Not sure if she was having an anxiety episode. Feeling well otherwise.  No f/c.  No recent illness.  Eating well per her report.     Current Outpatient Medications   Medication     escitalopram (LEXAPRO) 20 MG tablet     hydrOXYzine (VISTARIL) 25 MG capsule     Omeprazole ([employer]S OMEPRAZOLE) 20 MG TBDD     No current facility-administered medications for this visit.      O:  NAD  /80   Pulse 86   HEENT: neg  Neck: No lad  Lungs. CTA  Heart: rrr without m/g  Abd:  Soft and nttp  Ext;  No edema      A:  Lightheadedness:  Resolved    P: I suspect that she was having a small panic attack which she has a h/o.  She will monitor her symptoms and make sure to eat a good dinner and try to get a full night's sleep.    RTC for further evaluation if these symptoms persist or worsen. Try a half tab of vistaril if these symptoms return and it is during the day time.  She understands that she would not practice if she uses this medication close to the time of practice.      Max Graves, ATC for  [employer] was present for the entire appointment.     Reina Fields MD, CAQ, FACSM, CCD  [facility]  Sports Medicine and Bone Health  Team Physician;  Athletics        Reina Fields MD

## 2021-03-22 NOTE — PROGRESS NOTES
S: 24 yo  female  presents with lightheadedness.  She noticed that she felt tingling around her mouth and then in her hands while walking outside.  She then felt lightheaded.  Felt a bit like a panic attack. No vertigo.  Prior to practice she continued to experience the lightheadedness.  Her ATC had her sit out.  Feeling better now.  Reports that her anxiety is doing fairly well on the Lexapro.  She did not use the vistaril.  Taking her medication daily.  Not sure if she was having an anxiety episode. Feeling well otherwise.  No f/c.  No recent illness.  Eating well per her report.     Current Outpatient Medications   Medication     escitalopram (LEXAPRO) 20 MG tablet     hydrOXYzine (VISTARIL) 25 MG capsule     Omeprazole (CVS OMEPRAZOLE) 20 MG TBDD     No current facility-administered medications for this visit.      O:  NAD  /80   Pulse 86   HEENT: neg  Neck: No lad  Lungs. CTA  Heart: rrr without m/g  Abd:  Soft and nttp  Ext;  No edema      A:  Lightheadedness:  Resolved    P: I suspect that she was having a small panic attack which she has a h/o.  She will monitor her symptoms and make sure to eat a good dinner and try to get a full night's sleep.    RTC for further evaluation if these symptoms persist or worsen. Try a half tab of vistaril if these symptoms return and it is during the day time.  She understands that she would not practice if she uses this medication close to the time of practice.      Max Graves, ATC for  [employer] was present for the entire appointment.     Reina Fields MD, CAQ, FACSM, CCD  [facility]  Sports Medicine and Bone Health  Team Physician;  Athletics

## 2021-06-17 ENCOUNTER — OFFICE VISIT (OUTPATIENT)
Dept: ORTHOPEDICS | Facility: CLINIC | Age: 26
End: 2021-06-17
Payer: COMMERCIAL

## 2021-06-17 ENCOUNTER — ANCILLARY PROCEDURE (OUTPATIENT)
Dept: GENERAL RADIOLOGY | Facility: CLINIC | Age: 26
End: 2021-06-17
Attending: ORTHOPAEDIC SURGERY
Payer: COMMERCIAL

## 2021-06-17 VITALS — WEIGHT: 222.5 LBS | HEIGHT: 72 IN | BODY MASS INDEX: 30.14 KG/M2

## 2021-06-17 DIAGNOSIS — M25.569 KNEE PAIN, UNSPECIFIED CHRONICITY, UNSPECIFIED LATERALITY: Primary | ICD-10-CM

## 2021-06-17 DIAGNOSIS — M25.569 KNEE PAIN, UNSPECIFIED CHRONICITY, UNSPECIFIED LATERALITY: ICD-10-CM

## 2021-06-17 DIAGNOSIS — Z98.890 STATUS POST MEDIAL MENISCECTOMY OF LEFT KNEE: Primary | ICD-10-CM

## 2021-06-17 PROCEDURE — 77073 BONE LENGTH STUDIES: CPT | Performed by: RADIOLOGY

## 2021-06-17 PROCEDURE — 99213 OFFICE O/P EST LOW 20 MIN: CPT | Performed by: ORTHOPAEDIC SURGERY

## 2021-06-17 ASSESSMENT — MIFFLIN-ST. JEOR: SCORE: 1869.5
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
## 2021-06-17 NOTE — PROGRESS NOTES
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  She had previously played at [employer].  She then transferred here for 1 year.    She is now leaving the team to return to her home state of [place] to finish graduate school.  This serves as an exit eval.    The patient reports intermittent left knee pain and swelling is her primary problem.  When she was actively training and competing with the volleyball team it was more significant, she reports that she has not done strenuous activity for several weeks and her knee is much better    When her knee is bothering her it is pain that she reports anteriorly based and feels as if it is under the knee more than on the medial side of the knee.  She also reports that it is most significant when she is in the bent knee position such as squatting, digging, stairclimbing.  She has no problems walking on level ground and level ground walking has not been a problem.    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  She sustained a twisting contact type injury to her left knee at which time her knee was locked.  Subsequent work-up revealed a bucket-handle tear of the medial meniscus.  She was never able to unlock it and after appropriate work-up she went immediately into surgery within a few days and had it removed.  She is understanding that it was removed rather than repaired as it was not repairable.    She is otherwise healthy.  She takes omeprazole on an as-needed basis.  She has been treated for depression in the past and currently is on Lexapro.    She reports that her weight is stable.     She is finishing her graduate school degree in sports management and will  then find a job.  She is not sure how much volleyball will be in her future but she would like to stay active in volleyball and other activities.    Physical exam reveals a woman of tall husky body size.  BMI computes to 30.5.    Examination of patient's bilateral hips reveal satisfactory range of motion without pain, external rotation greater than internal rotation    Examination of patient's left knee reveals no knee swelling on today's exam.  Good straight leg raising effort without a lag.  Range of motion 0 to 135 degrees.  No crepitus to active extension.  No joint line tenderness medial or lateral.  No pain to circumduction maneuvers.  Stable knee exam, stable kneecap exam.    X-rays taken previously show standing films suggesting medial compartment narrowing as well as a small medial osteophyte.  Patellofemoral images are satisfactory.    An MRI that was taken in August 2020 revealed a para meniscal cyst posterior medial as well as a truncated medial meniscus primarily in the midportion of the joint.  The remaining meniscus is protruded laterally.  There is partial-thickness tearing of the remaining meniscal rim posteriorly.    Patient has thinning of the posterior medial femoral cartilage with bone edema in that region.  Lateral compartment appears satisfactory    Patellofemoral compartment shows a located patella without tilt or translation.  Patient has partial-thickness wear as long as well is a grade 4 fissuring in the inferomedial patella facet with surrounding heterogenicity of the cartilage which is approximately 12 mm in height on the axial views.  No subchondral edema is appreciated.    Today we took standing alignment views.  Patient shows a 1 degree varus alignment on her right unaffected leg and a 4 degree varus alignment on her left affected limb.  This translates to a weightbearing line that falls in zone 1 medial of a 3 zone system.    Assessment: Patient is now 9 years status post a subtotal  medial meniscectomy, which was removed at a young age.  She shows radiographic and MRI evidence of a medial compartment at risk.    That said her symptoms sound patellofemoral in nature, and this medial sided patella wear could be related to her overall varus alignment, or could be related to the original injury which could have involved a blunt trauma to the medial side of her knee.  It is unusual to have this form of medial sided wear in the absence of a patella dislocation, which is not part of her history or is supported by physical exam.    I think at her young age, with her medial compartment cartilage at risk as well as her varus deformity and asymmetric limb alignment, she could be considered for a meniscus transplant combined with a small medial opening wedge osteotomy.  This would really be treating the long-term health of her knee and may be not addressing specifically her current symptoms.    I suggested to her that it would be wise to start with an arthroscopy, examined the medial meniscus as well as medial compartment to assess the situation, as well as examining the patella to see if it would be appropriate for some form of cartilage restoration.   The current MRI is less than a year old and I would not favor repeating it.    Because patient's current symptoms appear to at least in part have been provoked by the subtotal knee medial meniscectomy 9 years ago, I would leave it up to our administrative athletic department to know how to proceed from here in regards to our responsibility towards future surgery    If she was to have future surgery or even add a staging arthroscopy, I would strongly encourage that this is done at the site that could advise and treat her with future surgery.  She lives in [place] and has access to an excellent sports medicine department at the [facility].      I specifically would advise seeing either Gilberto Johnson or Dennis Collins in this regard.    She is leaving town  within a few weeks.  We have given her a copy of the imaging that was performed here at the [facility].  We will send her this note by email.    If there are any further questions or concerns she will contact our office.    Room time 25 minutes consultation time 20    Kaylah Cortes MD  Professor Orthopedic Surgery  [facility]

## 2021-06-17 NOTE — NURSING NOTE
"Reason For Visit:   Chief Complaint   Patient presents with     Consult     Left knee pain.  Torn Meniscus.  Looking for options.        Primary MD: No primary care provider on file.  Referring MD: Student Athlete    ?  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  Currently working? No.    Date of injury: 8 years a go when she was in 8th grade. - Torn meniscus    Date of surgery: 8 years ago  Type of surgery: Left knee meisectomy.  Smoker: No  Request smoking cessation information: No    Ht 1.818 m (5' 11.58\")   Wt 100.9 kg (222 lb 8 oz)   BMI 30.54 kg/m      Pain Assessment  Patient Currently in Pain: Denies    "

## 2021-10-10 ENCOUNTER — HEALTH MAINTENANCE LETTER (OUTPATIENT)
Age: 26
End: 2021-10-10

## 2022-01-30 ENCOUNTER — HEALTH MAINTENANCE LETTER (OUTPATIENT)
Age: 27
End: 2022-01-30

## 2022-09-24 ENCOUNTER — HEALTH MAINTENANCE LETTER (OUTPATIENT)
Age: 27
End: 2022-09-24

## 2022-12-14 NOTE — LETTER
11/11/2020       RE: Rodrigo Barboza  [address] [id number]     Dear Colleague,    Thank you for referring your patient, Rodrigo Barboza, to the [facility]. Please see a copy of my visit note below.    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 Has had anxiety through high school and college. Feels like her anxiey is getting worse recently. Used to have panic episodes last year. Hasn't had any this year.  No thoughts of self harm or suicide. Has not tried any medications in the past.      PMH, Medications and Allergies were reviewed and updated as needed.    ROS:  As noted above otherwise negative.    There is no problem list on file for this patient.      Current Outpatient Medications   Medication Sig Dispense Refill     hydrOXYzine (VISTARIL) 25 MG capsule Take 1-2 capsules (25-50 mg) by mouth 3 times daily as needed for itching 90 capsule 0     sertraline (ZOLOFT) 50 MG tablet Take 1 tablet (50 mg) by mouth daily 30 tablet 0            OBJECTIVE:       Vitals: There were no vitals filed for this visit.  BMI: There is no height or weight on file to calculate BMI.    GENERAL: Healthy, alert and no distress  EYES: Eyes grossly normal to inspection.  No discharge or erythema, or obvious scleral/conjunctival abnormalities.  RESP: No audible wheeze, cough, or visible cyanosis.  No visible retractions or increased work of breathing.    SKIN: Visible skin clear. No significant rash, abnormal pigmentation or lesions.  NEURO: Cranial nerves grossly intact.  Mentation and speech appropriate for age.  PSYCH: Mentation appears normal, affect normal/bright, judgement and insight intact, normal speech and appearance well-groomed.        "   ASSESSMENT and PLAN:     1. Generalized anxiety disorder  - sertraline (ZOLOFT) 50 MG tablet; Take 1 tablet (50 mg) by mouth daily  Dispense: 30 tablet; Refill: 0  - hydrOXYzine (VISTARIL) 25 MG capsule; Take 1-2 capsules (25-50 mg) by mouth 3 times daily as needed for itching  Dispense: 90 capsule; Refill: 0    Return to clinic in 2 weeks for follow up of symptoms. Return sooner if develops new or worsening symptoms.    Options for treatment and/or follow-up care were reviewed with the patient was actively involved in the decision making process. Patient verbalized understanding and was in agreement with the plan.    The patient was seen by and discussed with Dr.Suzanne MIKE Fields MD, CAQ, CCD    Sofi Torres DO  Primary Care Sports Medicine Fellow      Video-Visit Details    Type of service:  Video Visit    Video Start Time: 3:40 pm     Video End Time (time video stopped): 3:50 pm    Total Visit time: 10 min    Originating Location (pt. Location): David Barboza is a 25 year old female who is being evaluated via a billable video visit.      The patient has been notified of following:     \"This video visit will be conducted via a call between you and your physician/provider. We have found that certain health care needs can be provided without the need for an in-person physical exam.  This service lets us provide the care you need with a video conversation.  If a prescription is necessary we can send it directly to your pharmacy.  If lab work is needed we can place an order for that and you can then stop by our lab to have the test done at a later time.    Video visits are billed at different rates depending on your insurance coverage.  Please reach out to your insurance provider with any questions.    If during the course of the call the physician/provider feels a video visit is not appropriate, you will not be charged for this service.\"    Patient has given verbal consent for Video visit? " Yes  How would you like to obtain your AVS? MyChart  Will anyone else be joining your video visit? Yes, ATC            Attending Note:   I have talked with this patient along with Dr. Torres via video visit and have reviewed the clinical presentation and watched the video examination with the fellow. I agree with the treatment plan as outlined. The plan was formulated with the fellow on the day of the patient's visit.   Reina Fields MD, CAQ, CCD  [facility]  Sports Medicine and Bone Health         Rifampin Counseling: I discussed with the patient the risks of rifampin including but not limited to liver damage, kidney damage, red-orange body fluids, nausea/vomiting and severe allergy.

## 2023-05-08 ENCOUNTER — HEALTH MAINTENANCE LETTER (OUTPATIENT)
Age: 28
End: 2023-05-08